# Patient Record
Sex: MALE | Race: OTHER | NOT HISPANIC OR LATINO | Employment: FULL TIME | URBAN - METROPOLITAN AREA
[De-identification: names, ages, dates, MRNs, and addresses within clinical notes are randomized per-mention and may not be internally consistent; named-entity substitution may affect disease eponyms.]

---

## 2019-08-04 ENCOUNTER — HOSPITAL ENCOUNTER (EMERGENCY)
Facility: HOSPITAL | Age: 29
Discharge: HOME | End: 2019-08-04
Attending: EMERGENCY MEDICINE | Admitting: EMERGENCY MEDICINE

## 2019-08-04 ENCOUNTER — APPOINTMENT (EMERGENCY)
Dept: RADIOLOGY | Facility: HOSPITAL | Age: 29
End: 2019-08-04

## 2019-08-04 VITALS
DIASTOLIC BLOOD PRESSURE: 61 MMHG | BODY MASS INDEX: 26.08 KG/M2 | TEMPERATURE: 98.3 F | OXYGEN SATURATION: 100 % | HEART RATE: 88 BPM | SYSTOLIC BLOOD PRESSURE: 136 MMHG | WEIGHT: 171.52 LBS | RESPIRATION RATE: 20 BRPM

## 2019-08-04 DIAGNOSIS — S43.014A ANTERIOR DISLOCATION OF RIGHT SHOULDER, INITIAL ENCOUNTER: ICD-10-CM

## 2019-08-04 DIAGNOSIS — M25.511 RIGHT SHOULDER PAIN: Primary | ICD-10-CM

## 2019-08-04 PROCEDURE — 23650 CLTX SHO DSLC W/MNPJ WO ANES: CPT | Performed by: EMERGENCY MEDICINE

## 2019-08-04 PROCEDURE — 96376 TX/PRO/DX INJ SAME DRUG ADON: CPT

## 2019-08-04 PROCEDURE — 99284 EMERGENCY DEPT VISIT MOD MDM: CPT

## 2019-08-04 PROCEDURE — 99285 EMERGENCY DEPT VISIT HI MDM: CPT | Performed by: EMERGENCY MEDICINE

## 2019-08-04 PROCEDURE — 96375 TX/PRO/DX INJ NEW DRUG ADDON: CPT

## 2019-08-04 PROCEDURE — 96374 THER/PROPH/DIAG INJ IV PUSH: CPT

## 2019-08-04 PROCEDURE — 73030 X-RAY EXAM OF SHOULDER: CPT

## 2019-08-04 RX ORDER — KETOROLAC TROMETHAMINE 30 MG/ML
30 INJECTION, SOLUTION INTRAMUSCULAR; INTRAVENOUS ONCE
Status: COMPLETED | OUTPATIENT
Start: 2019-08-04 | End: 2019-08-04

## 2019-08-04 RX ORDER — PROPOFOL 10 MG/ML
100 INJECTION, EMULSION INTRAVENOUS ONCE
Status: COMPLETED | OUTPATIENT
Start: 2019-08-04 | End: 2019-08-04

## 2019-08-04 RX ORDER — ACETAMINOPHEN 500 MG
1000 TABLET ORAL AS NEEDED
COMMUNITY
End: 2021-09-13 | Stop reason: ALTCHOICE

## 2019-08-04 RX ORDER — FENTANYL CITRATE 50 UG/ML
50 INJECTION, SOLUTION INTRAMUSCULAR; INTRAVENOUS ONCE
Status: COMPLETED | OUTPATIENT
Start: 2019-08-04 | End: 2019-08-04

## 2019-08-04 RX ORDER — IBUPROFEN 600 MG/1
600 TABLET ORAL EVERY 8 HOURS PRN
Qty: 15 TABLET | Refills: 0 | Status: SHIPPED | OUTPATIENT
Start: 2019-08-04 | End: 2020-03-10 | Stop reason: ALTCHOICE

## 2019-08-04 RX ORDER — PROPOFOL 10 MG/ML
20 INJECTION, EMULSION INTRAVENOUS ONCE
Status: COMPLETED | OUTPATIENT
Start: 2019-08-04 | End: 2019-08-04

## 2019-08-04 RX ORDER — FENTANYL CITRATE 50 UG/ML
100 INJECTION, SOLUTION INTRAMUSCULAR; INTRAVENOUS ONCE
Status: COMPLETED | OUTPATIENT
Start: 2019-08-04 | End: 2019-08-04

## 2019-08-04 RX ADMIN — FENTANYL CITRATE 50 MCG: 50 INJECTION, SOLUTION INTRAMUSCULAR; INTRAVENOUS at 16:45

## 2019-08-04 RX ADMIN — FENTANYL CITRATE 100 MCG: 50 INJECTION, SOLUTION INTRAMUSCULAR; INTRAVENOUS at 16:15

## 2019-08-04 RX ADMIN — PROPOFOL 80 MG: 10 INJECTION, EMULSION INTRAVENOUS at 16:44

## 2019-08-04 RX ADMIN — PROPOFOL 20 MG: 10 INJECTION, EMULSION INTRAVENOUS at 16:48

## 2019-08-04 RX ADMIN — KETOROLAC TROMETHAMINE 30 MG: 30 INJECTION, SOLUTION INTRAMUSCULAR at 16:15

## 2019-08-04 NOTE — ED PROVIDER NOTES
History  Chief Complaint   Patient presents with    Arm Pain     right arm pain, ligting something heavy, concerned for dislocated shoulder  History provided by:  Patient   used: No    Arm Pain   Location:  Right arm  Quality:  Felt popped out when lifting heavy box  Severity:  Moderate  Onset quality:  Sudden  Duration:  2 hours  Timing:  Sporadic  Progression:  Unchanged  Chronicity:  New  Context:  Patient was lifting heavy box, felt right shoulder popped out and went in and possibly popped back out  Relieved by:  Nothing  Worsened by:  Nothing  Ineffective treatments:  None  Associated symptoms: no abdominal pain, no chest pain, no cough, no diarrhea, no fever, no headaches, no loss of consciousness, no nausea, no rash, no shortness of breath, no sore throat and no vomiting    Risk factors:  None      Prior to Admission Medications   Prescriptions Last Dose Informant Patient Reported? Taking?   acetaminophen (TYLENOL) 500 mg tablet   Yes Yes   Sig: Take 1,000 mg by mouth as needed for mild pain      Facility-Administered Medications: None       History reviewed  No pertinent past medical history  History reviewed  No pertinent surgical history  History reviewed  No pertinent family history  I have reviewed and agree with the history as documented  Social History     Tobacco Use    Smoking status: Current Some Day Smoker    Smokeless tobacco: Never Used   Substance Use Topics    Alcohol use: Not Currently     Frequency: Never    Drug use: Never        Review of Systems   Constitutional: Negative for chills and fever  HENT: Negative for facial swelling, sore throat and trouble swallowing  Eyes: Negative for pain and visual disturbance  Respiratory: Negative for cough and shortness of breath  Cardiovascular: Negative for chest pain and leg swelling  Gastrointestinal: Negative for abdominal pain, blood in stool, diarrhea, nausea and vomiting     Genitourinary: Negative for dysuria and flank pain  Musculoskeletal: Negative for back pain, neck pain and neck stiffness  Skin: Negative for pallor and rash  Allergic/Immunologic: Negative for environmental allergies and immunocompromised state  Neurological: Negative for dizziness, loss of consciousness and headaches  Hematological: Negative for adenopathy  Does not bruise/bleed easily  Psychiatric/Behavioral: Negative for agitation and behavioral problems  All other systems reviewed and are negative  Physical Exam  Physical Exam   Constitutional: He is oriented to person, place, and time  He appears well-developed and well-nourished  No distress  HENT:   Head: Normocephalic and atraumatic  Eyes: EOM are normal    Neck: Normal range of motion  Neck supple  Cardiovascular: Normal rate, regular rhythm, normal heart sounds and intact distal pulses  Pulmonary/Chest: Effort normal and breath sounds normal    Abdominal: Soft  Bowel sounds are normal  There is no tenderness  There is no rebound and no guarding  Musculoskeletal:   Deformity and decreased ROM of right shoulder, NV intact distally   Neurological: He is alert and oriented to person, place, and time  Skin: Skin is warm and dry  Psychiatric: He has a normal mood and affect  Nursing note and vitals reviewed        Vital Signs  ED Triage Vitals   Temperature Pulse Respirations Blood Pressure SpO2   08/04/19 1536 08/04/19 1537 08/04/19 1537 08/04/19 1537 08/04/19 1537   98 3 °F (36 8 °C) 95 18 143/76 99 %      Temp Source Heart Rate Source Patient Position - Orthostatic VS BP Location FiO2 (%)   08/04/19 1536 08/04/19 1537 08/04/19 1537 08/04/19 1537 --   Temporal Monitor Sitting Left arm       Pain Score       08/04/19 1537       Worst Possible Pain           Vitals:    08/04/19 1658 08/04/19 1703 08/04/19 1708 08/04/19 1730   BP: 117/59 117/63 117/61 136/61   Pulse: 73 79 88 88   Patient Position - Orthostatic VS: Lying Lying Lying Lying Visual Acuity      ED Medications  Medications   fentanyl citrate (PF) 100 MCG/2ML 100 mcg (100 mcg Intravenous Given 8/4/19 1615)   ketorolac (TORADOL) injection 30 mg (30 mg Intravenous Given 8/4/19 1615)   propofol (DIPRIVAN) 200 MG/20ML bolus injection 100 mg (80 mg Intravenous Given by Other 8/4/19 1644)   fentanyl citrate (PF) 100 MCG/2ML 50 mcg (50 mcg Intravenous Given 8/4/19 1645)   propofol (DIPRIVAN) 200 MG/20ML bolus injection 20 mg (20 mg Intravenous Given by Other 8/4/19 1648)       Diagnostic Studies  Results Reviewed     None                 XR shoulder 2+ vw right   Final Result by Carolann Enrique MD (08/04 1712)      Anatomic alignment of the right glenohumeral joint status post closed reduction  No fracture              Workstation performed: [id number]         XR shoulder 2+ views RIGHT    (Results Pending)              Procedures  Procedural Sedation  Date/Time: 8/4/2019 4:59 PM  Performed by: Inocencia Allison MD  Authorized by: Inocencia Allison MD     Immediate pre-procedure details:     Reassessment: Patient reassessed immediately prior to procedure      Reviewed: vital signs      Verified: bag valve mask available, emergency equipment available, intubation equipment available, IV patency confirmed, oxygen available, reversal medications available and suction available    Procedure details (see MAR for exact dosages):     Preoxygenation:  Nasal cannula    Sedation:  Propofol    Analgesia:  Fentanyl    Intra-procedure monitoring:  Blood pressure monitoring, cardiac monitor, continuous capnometry, continuous pulse oximetry, frequent vital sign checks and frequent LOC assessments    Intra-procedure events: none    Post-procedure details:     Attendance: Constant attendance by certified staff until patient recovered      Recovery: Patient returned to pre-procedure baseline      Post-sedation assessments completed and reviewed: airway patency, cardiovascular function, hydration status, mental status, nausea/vomiting, pain level, respiratory function and temperature      Patient is stable for discharge or admission: yes      Patient tolerance: Tolerated well, no immediate complications  Orthopedic injury treatment  Date/Time: 8/4/2019 5:02 PM  Performed by: Radha Madrigal MD  Authorized by: Radha Madrigal MD     Patient Location:  ED  Other Assisting Provider: No    Written consent obtained?: Yes    Risks and benefits: Risks, benefits and alternatives were discussed    Consent given by:  Patient and parent  Patient states understanding of procedure being performed: Yes    Patient's understanding of procedure matches consent: Yes    Procedure consent matches procedure scheduled: Yes    Relevant documents present and verified: Yes    Test results available and properly labeled: Yes    Radiology Images displayed and confirmed  If images not available, report reviewed: Yes    Patient identity confirmed:  Verbally with patient  Time out: Immediately prior to the procedure a time out was called    Injury location:  Shoulder  Location details:  Right shoulder  Injury type:  Dislocation  Dislocation type: anterior    Chronicity:  New  Hill-Sachs deformity?: No    Neurovascular status: Neurovascularly intact    Distal perfusion: normal    Neurological function: normal    Range of motion: reduced    Local anesthesia used?: No    Sedation type:   Moderate (conscious) sedation (See separate Procedural Sedation form)  Manipulation performed?: Yes    Reduction method:  External rotation and traction and counter traction  Reduction method:  External rotation and traction and counter traction  Reduction method:  External rotation and traction and counter traction  Reduction method:  External rotation and traction and counter traction  Reduction method:  External rotation and traction and counter traction  Reduction method:  External rotation and traction and counter traction  Reduction successful?: Yes    Confirmation: Reduction confirmed by x-ray    Immobilization:  Other (comment) (Shoulder immobilizer)  Neurovascular status: Neurovascularly intact    Distal perfusion: normal    Neurological function: normal    Range of motion: normal    CriticalCare Time  Performed by: Adriel Peña MD  Authorized by: Adriel Peña MD     Critical care provider statement:     Critical care time (minutes):  30    Critical care time was exclusive of:  Separately billable procedures and treating other patients and teaching time    Critical care was necessary to treat or prevent imminent or life-threatening deterioration of the following conditions:  Trauma (Right shoulder dislocation, requiring procedural sedation and reduction)    Critical care was time spent personally by me on the following activities:  Blood draw for specimens, obtaining history from patient or surrogate, development of treatment plan with patient or surrogate, discussions with consultants, evaluation of patient's response to treatment, examination of patient, interpretation of cardiac output measurements, ordering and performing treatments and interventions, ordering and review of laboratory studies, ordering and review of radiographic studies, re-evaluation of patient's condition and review of old charts    I assumed direction of critical care for this patient from another provider in my specialty: no             ED Course  ED Course as of Aug 04 2152   Sun Aug 04, 2019   1631 X-ray showed anterior dislocation of the right shoulder, which was confirmed by radiologist       8285 Procedure sedation and reduction of the right shoulder anterior dislocation performed, confirmed on x-ray, also discussed with radiologist                                   MDM  Number of Diagnoses or Management Options  Anterior dislocation of right shoulder, initial encounter: new and requires workup  Right shoulder pain: new and requires workup  Diagnosis management comments: Patient is a 28-year-old male, comes in with complaints of right shoulder pain after he was lifting heavy box, felt that the shoulder popped out, went back in and then possibly pop back out; patient supporting the right arm in almost perpendicular position to stay comfortable; on exam deformity and decreased range of movement of the right shoulder noted  Differential diagnosis:  Right shoulder dislocation, AC separation, will check x-ray, give pain meds  Amount and/or Complexity of Data Reviewed  Tests in the radiology section of CPT®: ordered and reviewed  Discuss the patient with other providers: yes (Radiologist)  Independent visualization of images, tracings, or specimens: yes        Disposition  Final diagnoses:   Right shoulder pain   Anterior dislocation of right shoulder, initial encounter     Time reflects when diagnosis was documented in both MDM as applicable and the Disposition within this note     Time User Action Codes Description Comment    8/4/2019  3:55 PM Edward Fox 917 Right shoulder pain     8/4/2019  5:13 PM Julio Leader Add [B47 180F] Anterior dislocation of right shoulder, initial encounter       ED Disposition     ED Disposition Condition Date/Time Comment    Discharge Stable Sun Aug 4, 2019  5:13 PM Yolanda Mendoza discharge to home/self care              Follow-up Information     Follow up With Specialties Details Why Contact Info Additional [address] [facility] Orthopedic Surgery Schedule an appointment as soon as possible for a visit   [address] [facility] [id number]  [address] [place] [address], [address] [place] [address],  [address] [place] [address], [phone]          Discharge Medication List as of 8/4/2019  5:18 PM      START taking these medications    Details   ibuprofen (MOTRIN) 600 mg tablet Take 1 tablet (600 mg total) by mouth every 8 (eight) hours as needed for moderate pain, Starting Sun 8/4/2019, Print           No discharge procedures on file      ED Provider  Electronically Signed by           Angela Christensen MD  08/04/19 7284

## 2019-08-05 ENCOUNTER — HOSPITAL ENCOUNTER (EMERGENCY)
Facility: HOSPITAL | Age: 29
Discharge: HOME | End: 2019-08-05
Attending: EMERGENCY MEDICINE | Admitting: EMERGENCY MEDICINE

## 2019-08-05 VITALS
WEIGHT: 159.39 LBS | OXYGEN SATURATION: 100 % | BODY MASS INDEX: 24.24 KG/M2 | HEART RATE: 81 BPM | DIASTOLIC BLOOD PRESSURE: 69 MMHG | TEMPERATURE: 98.5 F | SYSTOLIC BLOOD PRESSURE: 133 MMHG | RESPIRATION RATE: 18 BRPM

## 2019-08-05 DIAGNOSIS — R11.2 NAUSEA AND VOMITING: Primary | ICD-10-CM

## 2019-08-05 LAB
BACTERIA UR QL AUTO: ABNORMAL /HPF
BILIRUB UR QL STRIP: ABNORMAL
CLARITY UR: CLEAR
COLOR UR: YELLOW
GLUCOSE UR STRIP-MCNC: NEGATIVE MG/DL
HGB UR QL STRIP.AUTO: ABNORMAL
KETONES UR STRIP-MCNC: ABNORMAL MG/DL
LEUKOCYTE ESTERASE UR QL STRIP: ABNORMAL
NITRITE UR QL STRIP: NEGATIVE
NON-SQ EPI CELLS URNS QL MICRO: ABNORMAL /HPF
OTHER STN SPEC: ABNORMAL
PH UR STRIP.AUTO: 5.5 [PH] (ref 4.5–8)
PROT UR STRIP-MCNC: ABNORMAL MG/DL
RBC #/AREA URNS AUTO: ABNORMAL /HPF
SP GR UR STRIP.AUTO: 1.02 (ref 1–1.03)
UROBILINOGEN UR QL STRIP.AUTO: 0.2 E.U./DL
WBC #/AREA URNS AUTO: ABNORMAL /HPF

## 2019-08-05 PROCEDURE — 87086 URINE CULTURE/COLONY COUNT: CPT

## 2019-08-05 PROCEDURE — 81001 URINALYSIS AUTO W/SCOPE: CPT

## 2019-08-05 PROCEDURE — 99283 EMERGENCY DEPT VISIT LOW MDM: CPT | Performed by: EMERGENCY MEDICINE

## 2019-08-05 PROCEDURE — 99283 EMERGENCY DEPT VISIT LOW MDM: CPT

## 2019-08-05 RX ORDER — ONDANSETRON 4 MG/1
4 TABLET, ORALLY DISINTEGRATING ORAL EVERY 6 HOURS PRN
Qty: 20 TABLET | Refills: 0 | Status: SHIPPED | OUTPATIENT
Start: 2019-08-05 | End: 2020-03-10 | Stop reason: ALTCHOICE

## 2019-08-05 RX ORDER — ONDANSETRON 4 MG/1
4 TABLET, ORALLY DISINTEGRATING ORAL ONCE
Status: COMPLETED | OUTPATIENT
Start: 2019-08-05 | End: 2019-08-05

## 2019-08-05 RX ADMIN — ONDANSETRON 4 MG: 4 TABLET, ORALLY DISINTEGRATING ORAL at 09:09

## 2019-08-05 NOTE — ED PROVIDER NOTES
History  Chief Complaint   Patient presents with    Nausea     pt c/o nausea and vomiting since last night, was here yesterday for dislocated shoulder and the medications that was given to him made him nauseas     35 YO male presents with nausea and vomiting starting last night  States thie began gradually, has been mostly constant  He has intermittently tolerated fluids but has not tried to eat anything  Pt denies associated abdominal pain but states his stomach feels mildly upset  He has not had diarrhea  Pt notes shoulder dislocation yesterday, he did receive propofol for sedation; unsure if this could have caused his symptoms  Unsure regarding sick contacts or suspicious food intake  Pt denies CP/SOB/F/C/D/C, no dysuria, burning on urination or blood in urine  History provided by:  Patient and relative   used: No    Nausea   The primary symptoms include nausea and vomiting  Primary symptoms do not include fever, abdominal pain, dysuria or rash  The illness began yesterday  The onset was gradual    The vomiting began yesterday  Vomiting occurs 2 to 5 times per day  The emesis contains stomach contents  The illness does not include chills  Prior to Admission Medications   Prescriptions Last Dose Informant Patient Reported? Taking?   acetaminophen (TYLENOL) 500 mg tablet 8/4/2019 at Unknown time  Yes Yes   Sig: Take 1,000 mg by mouth as needed for mild pain   ibuprofen (MOTRIN) 600 mg tablet 8/4/2019 at Unknown time  No Yes   Sig: Take 1 tablet (600 mg total) by mouth every 8 (eight) hours as needed for moderate pain      Facility-Administered Medications: None       History reviewed  No pertinent past medical history  History reviewed  No pertinent surgical history  History reviewed  No pertinent family history  I have reviewed and agree with the history as documented      Social History     Tobacco Use    Smoking status: Current Some Day Smoker    Smokeless tobacco: Never Used   Substance Use Topics    Alcohol use: Yes     Frequency: Never     Comment: occasionally    Drug use: Never        Review of Systems   Constitutional: Negative for chills and fever  HENT: Negative for dental problem  Eyes: Negative for visual disturbance  Respiratory: Negative for shortness of breath  Cardiovascular: Negative for chest pain  Gastrointestinal: Positive for nausea and vomiting  Negative for abdominal pain  Genitourinary: Negative for dysuria and frequency  Musculoskeletal: Negative for neck pain and neck stiffness  Skin: Negative for rash  Neurological: Negative for dizziness, weakness and light-headedness  Psychiatric/Behavioral: Negative for agitation, behavioral problems and confusion  All other systems reviewed and are negative  Physical Exam  Physical Exam   Constitutional: He is oriented to person, place, and time  He appears well-developed and well-nourished  HENT:   Head: Normocephalic and atraumatic  Eyes: Pupils are equal, round, and reactive to light  EOM are normal    Neck: Normal range of motion  Cardiovascular: Normal rate, regular rhythm and normal heart sounds  Pulmonary/Chest: Effort normal and breath sounds normal    Abdominal: Soft  There is no tenderness  Musculoskeletal: Normal range of motion  Neurological: He is alert and oriented to person, place, and time  Skin: Skin is warm and dry  Psychiatric: He has a normal mood and affect  His behavior is normal    Nursing note and vitals reviewed        Vital Signs  ED Triage Vitals [08/05/19 0850]   Temperature Pulse Respirations Blood Pressure SpO2   98 5 °F (36 9 °C) 81 18 133/69 100 %      Temp Source Heart Rate Source Patient Position - Orthostatic VS BP Location FiO2 (%)   Temporal Monitor Sitting Left arm --      Pain Score       2           Vitals:    08/05/19 0850   BP: 133/69   Pulse: 81   Patient Position - Orthostatic VS: Sitting         Visual Acuity      ED Medications  Medications   ondansetron (ZOFRAN-ODT) dispersible tablet 4 mg (4 mg Oral Given 8/5/19 0909)       Diagnostic Studies  Results Reviewed     Procedure Component Value Units Date/Time    Urine Microscopic [[id number]]  (Abnormal) Collected:  08/05/19 0933    Lab Status:  Final result Specimen:  Urine, Clean Catch Updated:  08/05/19 1051     RBC, UA 2-4 /hpf      WBC, UA 20-30 /hpf      Epithelial Cells Occasional /hpf      Bacteria, UA Occasional /hpf      OTHER OBSERVATIONS Trichomonas Organisms Present    Urine culture [[id number]] Collected:  08/05/19 0933    Lab Status: In process Specimen:  Urine, Clean Catch Updated:  08/05/19 1050    POCT urinalysis dipstick [[id number]]  (Abnormal) Resulted:  08/05/19 0920    Lab Status:  Final result Specimen:  Urine Updated:  08/05/19 0920    ED Urine Macroscopic [[id number]]  (Abnormal) Collected:  08/05/19 0933    Lab Status:  Final result Specimen:  Urine Updated:  08/05/19 0919     Color, UA Yellow     Clarity, UA Clear     pH, UA 5 5     Leukocytes, UA Small     Nitrite, UA Negative     Protein,  (2+) mg/dl      Glucose, UA Negative mg/dl      Ketones, UA 15 (1+) mg/dl      Urobilinogen, UA 0 2 E U /dl      Bilirubin, UA Interference- unable to analyze     Blood, UA Trace     Specific [place], UA 1 020    Narrative:       CLINITEK RESULT                 No orders to display              Procedures  Procedures       ED Course  ED Course as of Aug 05 1323   [facility] Aug 05, 2019   0135 Pt states feeling somewhat better, will PO challenge  MDM  Number of Diagnoses or Management Options  Nausea and vomiting: new and does not require workup  Diagnosis management comments: 1  Nausea with vomiting - No abdominal pain  Pt appears well hydrated  Will check urine for infection, try oral Zofran, reassess         Amount and/or Complexity of Data Reviewed  Clinical lab tests: ordered and reviewed  Obtain history from someone other than the patient: yes  Review and summarize past medical records: yes    Patient Progress  Patient progress: improved      Disposition  Final diagnoses:   Nausea and vomiting     Time reflects when diagnosis was documented in both MDM as applicable and the Disposition within this note     Time User Action Codes Description Comment    8/5/2019  9:49 AM Georgialoren Gabrielaalla JOEL Add [R11 2] Nausea and vomiting       ED Disposition     ED Disposition Condition Date/Time Comment    Discharge Stable Mon Aug 5, 2019  9:49 AM Carlo Stager discharge to home/self care  Follow-up Information    None         Discharge Medication List as of 8/5/2019  9:50 AM      START taking these medications    Details   ondansetron (ZOFRAN-ODT) 4 mg disintegrating tablet Take 1 tablet (4 mg total) by mouth every 6 (six) hours as needed for nausea, Starting Mon 8/5/2019, Print         CONTINUE these medications which have NOT CHANGED    Details   acetaminophen (TYLENOL) 500 mg tablet Take 1,000 mg by mouth as needed for mild pain, Historical Med      ibuprofen (MOTRIN) 600 mg tablet Take 1 tablet (600 mg total) by mouth every 8 (eight) hours as needed for moderate pain, Starting Sun 8/4/2019, Print           No discharge procedures on file      ED Provider  Electronically Signed by           Hermes Hook MD  08/05/19 8432

## 2019-08-05 NOTE — DISCHARGE INSTRUCTIONS
Take the zofran as needed for nausea, this can be placed under the tongue to dissolve if you are vomiting

## 2019-08-06 LAB — BACTERIA UR CULT: NORMAL

## 2019-08-10 VITALS — WEIGHT: 174.6 LBS | BODY MASS INDEX: 25.86 KG/M2 | HEIGHT: 69 IN

## 2019-08-10 DIAGNOSIS — S43.004A SHOULDER DISLOCATION, RIGHT, INITIAL ENCOUNTER: Primary | ICD-10-CM

## 2019-08-10 PROCEDURE — 99204 OFFICE O/P NEW MOD 45 MIN: CPT | Performed by: FAMILY MEDICINE

## 2019-08-10 NOTE — PROGRESS NOTES
1  Shoulder dislocation, right, initial encounter       No orders of the defined types were placed in this encounter  Imaging Studies (I personally reviewed images in PACS and report):  X-ray right shoulder a 419:  Anatomic alignment right glenohumeral joint status post reduction  No fracture  X-ray right shoulder 08/04/2019:  Anterior glenohumeral joint dislocation with no underlying fracture    IMPRESSION:  Right shoulder dislocation  Date of Injury:  08/04/2019  Follow-up interval:  6 days      Repeat X-ray next visit:    Yes if not done right shoulder use as below  true AP   scapular Y   axillary  [place] [employer] view       Return in about 2 weeks (around 8/24/2019)  Patient Instructions   Explained the patient that he has dislocation of his right shoulder anteriorly status post reduction and appears stable at this time  Explained there is a risk of recurrent dislocation especially if he has underlying soft tissue injury such as a labral tear which cannot be seen on an x-ray  At this time I recommended x-rays with specialty views to further evaluate for any bony abnormality which cannot be seen on regular x-rays performed in the emergency department  Patient has no medical insurance at this time and his pain out-of-pocket  He plans to discuss having a discounted x-ray with [facility]  staff and also working on reinstating his medical insurance through his employer  I explained that he could attempt trial of rehabilitation with physical therapy and if he does have persistent pain or recurrent dislocation that I would highly recommend MRI evaluation and consideration of surgical intervention specially if he wants to remain active and since he is right handed  Explained the patient for his nausea and vomiting appears he does have resolving gastroenteritis  I recommend maintaining hydration with water as well as electrolytes such as Gatorade    He may continue to eat food as tolerated and is having difficulty I recommended Brat diet denies rice apples toe post   I explained to him that if he has any worsening or consistent abdominal pain or worsening vomiting that he must go to emergency department immediately for further evaluation as he could have a medical emergency  CHIEF COMPLAINT:  Right shoulder dislocation    HPI:  Peg Lang is a 34 y o  male  who presents for       Visit 08/10/2019:  Evaluation right shoulder dislocation occurred on 08/04/2019  Patient did present to the ER for initial evaluation  Summary of ER note 08/04/2019:  Patient follow up pop when lifting heavy box with rods in them helping his friend relocate some items in his garage  Patient noted to have anterior right shoulder dislocation  He was neurovascularly intact  Reduction with external rotation traction and counter traction  Conscious sedation  Reduction confirmed on x-ray  Patient states that recently when he went home after his ER visit on 08/04/2019 he developed nausea and vomiting that night  He also had persistent nausea vomiting next few days  He has intermittent cramping in his belly but denies any worsening abdominal pain or any constant abdominal pain  Last bowel movement no blood, no constipation, no diarrhea, brown usual color and consistency  Patient states he did have 1 episode this morning of small amount of vomitus but this is greatly improved since initially beginning earlier this week  He currently denies any abdominal pain  He denies any difficulty urinating  He denies any pain when he urinates  He denies any blood in his urine  States that he believes he became somewhat sick after having his conscious sedation during his reduction  Review of Systems   Constitutional: Negative for chills, fever and unexpected weight change  HENT: Negative for hearing loss, nosebleeds and sore throat      Eyes: Negative for pain, redness and visual disturbance  Respiratory: Negative for cough, shortness of breath and wheezing  Cardiovascular: Negative for chest pain, palpitations and leg swelling  Gastrointestinal: Negative for abdominal distention  Endocrine: Negative for polydipsia and polyuria  Genitourinary: Negative for dysuria and hematuria  Skin: Negative for rash and wound  Neurological: Negative for dizziness, numbness and headaches  Psychiatric/Behavioral: Negative for decreased concentration and suicidal ideas  Following history reviewed and update:    History reviewed  No pertinent past medical history  Past Surgical History:   Procedure Laterality Date    SKIN GRAFT       Social History   Social History     Substance and Sexual Activity   Alcohol Use Yes    Frequency: Never    Comment: occasionally     Social History     Substance and Sexual Activity   Drug Use Never     Social History     Tobacco Use   Smoking Status Current Some Day Smoker   Smokeless Tobacco Never Used     History reviewed  No pertinent family history  No Known Allergies       Physical Exam  Ht 5' 9" (1 753 m)   Wt 79 2 kg (174 lb 9 6 oz)   BMI 25 78 kg/m²     Constitutional:  see vital signs  Gen: well-developed, normocephalic/atraumatic, well-groomed  Eyes: No inflammation or discharge of conjunctiva or lids; sclera clear   Pharynx: no inflammation, lesion, or mass of lips  Neck: supple, no masses, non-distended  MSK: no inflammation, lesion, mass, or clubbing of nails and digits except for other than mentioned below  SKIN: no visible rashes or skin lesions  Pulmonary/Chest: Effort normal  No respiratory distress     NEURO: cranial nerves grossly intact  PSYCH:  Alert and oriented to person, place, and time; recent and remote memory intact; mood normal, no depression, anxiety, or agitation, judgment and insight good and intact     Ortho Exam    Cervical  ROM: intact  Tenderness: no spinous process tenderness;   Sensation UE Bilateral:  C5: normal  C6: normal  C7: normal  C8: normal  T1: normal  Strength UE: 5/5 elbow, wrist, fingers bilatearl  Reflexes: symmetric bilateral triceps, biceps, corachobrachiais  Spurlings:     RIGHT SHOULDER:  Erythema: no  Swelling: no  Increased Warmth: no    Tenderness:  non    ROM  Touchdown sign: intact  Appley Scratch Test: symmetric  Passive: symmetric    Strength  Abduction: 5/5  ER: 5/5  IR: 5/5    Drop-Arm: negative  Emptycan: negative  Belly Press: negative  Lift-off Test:    Aragon: negative  Neer: negative  Cross-Arm: negative  Speeds: negative    Internal Impingement:+  (crank position pain)    Labral Crank Test :+  (abducted 90, axial load, guided IR & Er)    Modified Labral Shift: negative  (seated, ER, abduction, axial load, guided abd/add)    [place]'s Test:  negative  (FF 90, abd 15, resist thumbs up-, resist thumbs down+)    Apprehension:+  Edith's Relocation Maneuver:+    LEFT SHOULDER:  Strength  Abduction: 5/5  ER: 5/5  IR: 5/5    ROM  Touchdown sign: intact  [place] Scratch Test: symmetric  Passive: symmetric    Empty can: negative           Procedures

## 2019-08-10 NOTE — PATIENT INSTRUCTIONS
Explained the patient that he has dislocation of his right shoulder anteriorly status post reduction and appears stable at this time  Explained there is a risk of recurrent dislocation especially if he has underlying soft tissue injury such as a labral tear which cannot be seen on an x-ray  At this time I recommended x-rays with specialty views to further evaluate for any bony abnormality which cannot be seen on regular x-rays performed in the emergency department  Patient has no medical insurance at this time and his pain out-of-pocket  He plans to discuss having a discounted x-ray with [facility]  staff and also working on reinstating his medical insurance through his employer  I explained that he could attempt trial of rehabilitation with physical therapy and if he does have persistent pain or recurrent dislocation that I would highly recommend MRI evaluation and consideration of surgical intervention specially if he wants to remain active and since he is right handed  Explained the patient for his nausea and vomiting appears he does have resolving gastroenteritis  I recommend maintaining hydration with water as well as electrolytes such as Gatorade  He may continue to eat food as tolerated and is having difficulty I recommended Brat diet denies rice apples toe post   I explained to him that if he has any worsening or consistent abdominal pain or worsening vomiting that he must go to emergency department immediately for further evaluation as he could have a medical emergency

## 2019-12-26 ENCOUNTER — APPOINTMENT (EMERGENCY)
Dept: RADIOLOGY | Facility: HOSPITAL | Age: 29
End: 2019-12-26

## 2019-12-26 ENCOUNTER — HOSPITAL ENCOUNTER (EMERGENCY)
Facility: HOSPITAL | Age: 29
Discharge: HOME | End: 2019-12-26
Attending: EMERGENCY MEDICINE | Admitting: EMERGENCY MEDICINE

## 2019-12-26 VITALS
SYSTOLIC BLOOD PRESSURE: 122 MMHG | RESPIRATION RATE: 16 BRPM | WEIGHT: 175.49 LBS | OXYGEN SATURATION: 100 % | BODY MASS INDEX: 25.91 KG/M2 | HEART RATE: 74 BPM | DIASTOLIC BLOOD PRESSURE: 61 MMHG | TEMPERATURE: 98.8 F

## 2019-12-26 DIAGNOSIS — S43.014A ANTERIOR SHOULDER DISLOCATION, RIGHT, INITIAL ENCOUNTER: Primary | ICD-10-CM

## 2019-12-26 PROCEDURE — 23650 CLTX SHO DSLC W/MNPJ WO ANES: CPT | Performed by: EMERGENCY MEDICINE

## 2019-12-26 PROCEDURE — 99283 EMERGENCY DEPT VISIT LOW MDM: CPT

## 2019-12-26 PROCEDURE — 99285 EMERGENCY DEPT VISIT HI MDM: CPT | Performed by: EMERGENCY MEDICINE

## 2019-12-26 PROCEDURE — 73030 X-RAY EXAM OF SHOULDER: CPT

## 2019-12-26 PROCEDURE — 96374 THER/PROPH/DIAG INJ IV PUSH: CPT

## 2019-12-26 PROCEDURE — 99152 MOD SED SAME PHYS/QHP 5/>YRS: CPT | Performed by: EMERGENCY MEDICINE

## 2019-12-26 RX ORDER — FENTANYL CITRATE 50 UG/ML
50 INJECTION, SOLUTION INTRAMUSCULAR; INTRAVENOUS ONCE
Status: COMPLETED | OUTPATIENT
Start: 2019-12-26 | End: 2019-12-26

## 2019-12-26 RX ORDER — PROPOFOL 10 MG/ML
200 INJECTION, EMULSION INTRAVENOUS ONCE
Status: COMPLETED | OUTPATIENT
Start: 2019-12-26 | End: 2019-12-26

## 2019-12-26 RX ADMIN — PROPOFOL 200 MG: 10 INJECTION, EMULSION INTRAVENOUS at 18:17

## 2019-12-26 RX ADMIN — FENTANYL CITRATE 50 MCG: 50 INJECTION, SOLUTION INTRAMUSCULAR; INTRAVENOUS at 17:53

## 2019-12-26 NOTE — ED NOTES
Per pt called girlfriend to let her know she could come back to pt's room      Becky Deleon RN  92/16/04 1434

## 2019-12-26 NOTE — ED ATTENDING ATTESTATION
12/26/2019  ILuis DO, saw and evaluated the patient  I have discussed the patient with the resident/non-physician practitioner and agree with the resident's/non-physician practitioner's findings, Plan of Care, and MDM as documented in the resident's/non-physician practitioner's note, except where noted  All available labs and Radiology studies were reviewed  I was present for key portions of any procedure(s) performed by the resident/non-physician practitioner and I was immediately available to provide assistance  At this point I agree with the current assessment done in the Emergency Department  I have conducted an independent evaluation of this patient a history and physical is as follows:    ED Course         Critical Care Time  Procedures  82-year-old male presents with right shoulder pain and probable dislocation  Patient states he was lifting the kaur of his car using only his right arm and felt his shoulder come out of place  He has a history of previous shoulder dislocation but never followed up   On exam he is alert complaining of pain in the right shoulder there is a deformity the right shoulder consistent with anterior dislocation  The right upper extremity is warm with good distal pulses  He denies numbness of the arm  Will do x-ray to confirm dislocation and rule out fracture  Will attempt to reduce the dislocation

## 2019-12-26 NOTE — ED PROVIDER NOTES
History  Chief Complaint   Patient presents with    Shoulder Injury     Patient reports he was lifting his car kaur, and believes his R shoulder is now dislocated  Patient states his shoulder has been dislocated in the past       This is a 61-year-old male with no significant past medical history who presents to the emergency department this evening with concerns over a right shoulder dislocation  Patient states that he was working on his car and lifted the kaur of his car up when he suddenly felt his shoulder pop out of place    He states that he has dislocated his right shoulder before and it was a few months ago  His current symptoms feel very similar to the previous  He denies any numbness, tingling, or weakness and he states that the pain is minimal as long as he does not move  He has not taken any pain medications as of yet  Prior to Admission Medications   Prescriptions Last Dose Informant Patient Reported? Taking?   acetaminophen (TYLENOL) 500 mg tablet   Yes No   Sig: Take 1,000 mg by mouth as needed for mild pain   ibuprofen (MOTRIN) 600 mg tablet   No No   Sig: Take 1 tablet (600 mg total) by mouth every 8 (eight) hours as needed for moderate pain   ondansetron (ZOFRAN-ODT) 4 mg disintegrating tablet   No No   Sig: Take 1 tablet (4 mg total) by mouth every 6 (six) hours as needed for nausea      Facility-Administered Medications: None       History reviewed  No pertinent past medical history  Past Surgical History:   Procedure Laterality Date    SKIN GRAFT         History reviewed  No pertinent family history  I have reviewed and agree with the history as documented  Social History     Tobacco Use    Smoking status: Current Some Day Smoker    Smokeless tobacco: Never Used   Substance Use Topics    Alcohol use: Yes     Frequency: Never     Comment: occasionally    Drug use: Never        Review of Systems   Constitutional: Negative for chills, diaphoresis and fever     HENT: Negative for congestion, rhinorrhea, sinus pressure and sore throat  Eyes: Negative for visual disturbance  Respiratory: Negative for cough, chest tightness and shortness of breath  Cardiovascular: Negative for chest pain  Gastrointestinal: Negative for abdominal pain, constipation, diarrhea, nausea and vomiting  Genitourinary: Negative for dysuria, frequency, hematuria and urgency  Musculoskeletal: Positive for arthralgias  Negative for myalgias  Skin: Negative for color change and rash  Neurological: Negative for dizziness, numbness and headaches  Physical Exam  ED Triage Vitals [12/26/19 1645]   Temperature Pulse Respirations Blood Pressure SpO2   98 8 °F (37 1 °C) (!) 106 18 136/58 98 %      Temp Source Heart Rate Source Patient Position - Orthostatic VS BP Location FiO2 (%)   Temporal Monitor Sitting Left arm --      Pain Score       Worst Possible Pain             Orthostatic Vital Signs  Vitals:    12/26/19 1835 12/26/19 1835 12/26/19 1836 12/26/19 1839   BP: 127/62  122/61    Pulse: 77 78  74   Patient Position - Orthostatic VS:           Physical Exam   Constitutional: He is oriented to person, place, and time  He appears well-developed and well-nourished  No distress  HENT:   Head: Normocephalic and atraumatic  Eyes: Pupils are equal, round, and reactive to light  Conjunctivae are normal    Neck: Normal range of motion  Neck supple  No JVD present  Cardiovascular: Normal rate, regular rhythm and normal heart sounds  Exam reveals no gallop and no friction rub  No murmur heard  Pulmonary/Chest: Effort normal and breath sounds normal  No stridor  No respiratory distress  He has no wheezes  He has no rales  Abdominal: Soft  Bowel sounds are normal  He exhibits no distension  There is no tenderness  There is no guarding  Musculoskeletal: Normal range of motion   He exhibits tenderness (Right shoulder) and deformity ( indentation of the skin just inferior to the acromial process on the right )  He exhibits no edema  Neurological: He is alert and oriented to person, place, and time  No cranial nerve deficit or sensory deficit  He exhibits normal muscle tone  No axillary nerve sensory disturbances bilaterally   Skin: Skin is warm and dry  No rash noted  He is not diaphoretic  No erythema  No pallor  Psychiatric: He has a normal mood and affect  His behavior is normal    Nursing note and vitals reviewed  ED Medications  Medications   fentanyl citrate (PF) 100 MCG/2ML 50 mcg (50 mcg Intravenous Given 12/26/19 1753)   propofol (DIPRIVAN) 200 MG/20ML bolus injection 200 mg (200 mg Intravenous Given 12/26/19 1817)       Diagnostic Studies  Results Reviewed     None                 XR shoulder 2+ views RIGHT   ED Interpretation by Vero Sosa MD (12/26 1728)   Anterior shoulder dislocation      Final Result by Renata Nieves MD (12/26 1824)      Anterior dislocation of the right shoulder  Workstation performed: [id number]         XR shoulder 2+ views RIGHT    (Results Pending)         Procedures  Orthopedic injury treatment  Date/Time: 12/26/2019 6:00 PM  Performed by: Vero Sosa MD  Authorized by: Vero Sosa MD     Patient Location:  ED  Other Assisting Provider: Yes (comment) Jules Roa    Verbal consent obtained?: Yes    Consent given by:  Patient  Patient identity confirmed:  Verbally with patient and arm band  Injury location:  Shoulder  Location details:  Right shoulder  Injury type:  Dislocation  Dislocation type: anterior    Chronicity:  Recurrent  Hill-Sachs deformity?: No    Neurovascular status: Neurovascularly intact    Distal perfusion: normal    Neurological function: normal    Range of motion: normal    Local anesthesia used?: No    General anesthesia used?: No    Sedation type:   Moderate (conscious) sedation (See separate Procedural Sedation form)  Manipulation performed?: Yes    Reduction method:  Kris maneuver  Reduction method:  Kris maneuver  Reduction method:  Kris maneuver  Reduction method:  Kris maneuver  Reduction method:  Kris maneuver  Reduction method:  Kris maneuver  Skeletal traction used?: Yes    Reduction successful?: Yes    Confirmation: Reduction confirmed by x-ray    Immobilization:  Sling  Neurovascular status: Neurovascularly intact    Distal perfusion: normal    Neurological function: normal    Range of motion: normal    Patient tolerance:  Patient tolerated the procedure well with no immediate complications  Procedural Sedation  Date/Time: 12/26/2019 6:00 PM  Performed by: Jeet Shelton MD  Authorized by: Jeet Shelton MD     Immediate pre-procedure details:     Reassessment: Patient reassessed immediately prior to procedure      Reviewed: vital signs      Verified: bag valve mask available, emergency equipment available, intubation equipment available, IV patency confirmed, oxygen available and suction available    Procedure details (see MAR for exact dosages):     Sedation start time:  12/26/2019 6:20 PM    Preoxygenation:  Nasal cannula    Sedation:  Propofol    Analgesia:  Fentanyl    Intra-procedure monitoring:  Blood pressure monitoring, continuous capnometry, frequent LOC assessments, frequent vital sign checks, continuous pulse oximetry and cardiac monitor    Intra-procedure events: none      Sedation end time:  12/26/2019 6:32 PM    Total sedation time (minutes):  12  Post-procedure details:     Post-sedation assessment completed:  12/26/2019 7:00 PM    Attendance: Constant attendance by certified staff until patient recovered      Recovery: Patient returned to pre-procedure baseline      Post-sedation assessments completed and reviewed: airway patency, cardiovascular function, hydration status, mental status, nausea/vomiting, pain level and respiratory function      Patient is stable for discharge or admission: yes      Patient tolerance:   Tolerated well, no immediate complications  Pre-Procedural Sedation  Performed by: Austen Bliss MD  Authorized by: Austen Bliss MD     Consent:     Consent obtained:  Verbal    Consent given by:  Patient    Risks discussed: Allergic reaction, prolonged hypoxia resulting in organ damage and respiratory compromise necessitating ventilatory assistance and intubation    Alternatives discussed:  Analgesia without sedation  Universal protocol:     Patient identity confirmation method:  Verbally with patient and arm band  Indications:     Sedation purpose:  Dislocation reduction    Procedure necessitating sedation performed by:  Physician performing sedation    Intended level of sedation:  Moderate (conscious sedation)  Pre-sedation assessment:     ASA classification: class 1 - normal, healthy patient      Neck mobility: normal      Mouth opening:  3 or more finger widths    Thyromental distance:  4 finger widths    Mallampati score:  I - soft palate, uvula, fauces, pillars visible    Pre-sedation assessments completed and reviewed: airway patency, cardiovascular function, hydration status, mental status, nausea/vomiting, pain level and respiratory function      History of difficult intubation: yes      Pre-sedation assessment completed:  12/26/2019 6:15 PM      Conscious Sedation Assessment      Classification Score   ASA Scale Assessment  1-Healthy patient, no disease outside surgical process filed at 12/26/2019 6234          ED Course                               MDM  Number of Diagnoses or Management Options  Anterior shoulder dislocation, right, initial encounter:   Diagnosis management comments: Attempted to relocate the patient shoulder without any analgesia using the Kris technique as he has a recurrent dislocation but was unsuccessful  Patient was then given 50 mg of fentanyl and 100 mg of propofol for conscious sedation and analgesia and the Kris technique was repeated this time successfully    Postprocedure x-ray confirmed that the shoulder had been relocated and the patient was given a sling and shoulder immobilizer and told to follow up with Orthopedic surgery as an outpatient  Patient discharged home in good condition with instructions to return to the emergency department for any worsening or otherwise concerning symptoms  Disposition  Final diagnoses:   Anterior shoulder dislocation, right, initial encounter     Time reflects when diagnosis was documented in both MDM as applicable and the Disposition within this note     Time User Action Codes Description Comment    12/26/2019  7:02 PM Noemyshirlene Reyes Add [[id number]] Anterior shoulder dislocation, right, initial encounter       ED Disposition     ED Disposition Condition Date/Time Comment    Discharge Stable Thu Dec 26, 2019  7:02 PM Dayami Peer discharge to home/self care              Follow-up Information     Follow up With Specialties Details Why Contact Info Additional Information    Ovidio Kingsley, [address] [facility]  [phone]       [facility] Emergency Department Emergency Medicine  If symptoms worsen [facility] [address]  [phone] [place] ED, [address] [place], [place], [facility] [address] Orthopedic Surgery   [address] [address]  [phone] [facility] [place] [address] [place], [place], [address]  [facility]          Discharge Medication List as of 12/26/2019  7:16 PM      CONTINUE these medications which have NOT CHANGED    Details   acetaminophen (TYLENOL) 500 mg tablet Take 1,000 mg by mouth as needed for mild pain, Historical Med      ibuprofen (MOTRIN) 600 mg tablet Take 1 tablet (600 mg total) by mouth every 8 (eight) hours as needed for moderate pain, Starting Sun 8/4/2019, Print      ondansetron (ZOFRAN-ODT) 4 mg disintegrating tablet Take 1 tablet (4 mg total) by mouth every 6 (six) hours as needed for nausea, Starting Mon 8/5/2019, Print           No discharge procedures on file  ED Provider  Attending physically available and evaluated Joaquina Morris I managed the patient along with the ED Attending      Electronically Signed by         Gerardo Wayne MD  12/27/19 97

## 2019-12-27 NOTE — DISCHARGE INSTRUCTIONS
Please follow up with Orthopedic surgery at the contact information below  Return to the ED for any worsening or otherwise concerning symptoms  Procedural Sedation   WHAT YOU NEED TO KNOW:   Procedural sedation is medicine used during procedures to help you feel relaxed and calm  You will remember little to none of the procedure  After sedation you may feel tired, weak, or unsteady on your feet  You may also have trouble concentrating or short-term memory loss  These symptoms should go away in 24 hours or less  DISCHARGE INSTRUCTIONS:   Call 911 or have someone else call for any of the following:   · You have sudden trouble breathing  · You cannot be woken  Return to the emergency department if:   · You have a severe headache or dizziness  · Your heart is beating faster than usual   Contact your healthcare provider if:   · You have a fever or chills  · Your skin is itchy, swollen, or you have a rash  · You have nausea or are vomiting for more than 8 hours after the procedure  · You have questions or concerns about your condition or care  Self-care:   · Have someone stay with you for 24 hours  This person can drive you to errands and help you do things around the house  This person can also watch for problems  · Rest and do quiet activities for 24 hours  Do not exercise, ride a bike, or play sports  Stand up slowly to prevent dizziness and falls  Take short walks around the house with another person  Slowly return to your usual activities the next day  · Do not drive or use dangerous machines or tools for 24 hours  You may injure yourself or others  Examples include a lawnmower, saw, or drill  Do not return to work for 24 hours if you use dangerous machines or tools for work  · Do not make important decisions for 24 hours  For example, do not sign important papers or invest money  · Drink liquids as directed  Liquids help flush the sedation medicine out of your body   Ask how much liquid to drink each day and which liquids are best for you  · Eat small, frequent meals to prevent nausea and vomiting  Start with clear liquids such as juice or broth  If you do not vomit after clear liquids, you can eat your usual foods  · Do not drink alcohol or take medicines that make you drowsy  This includes medicines that help you sleep and anxiety medicines  Ask your healthcare provider if it is safe for you to take pain medicine  Follow up with your healthcare provider as directed:  Write down your questions so you remember to ask them during your visits  © 2017 [facility] Information is for End User's use only and may not be sold, redistributed or otherwise used for commercial purposes  All illustrations and images included in CareNotes® are the copyrighted property of [employer] , [employer]  or Hector Carmona  The above information is an  only  It is not intended as medical advice for individual conditions or treatments  Talk to your doctor, nurse or pharmacist before following any medical regimen to see if it is safe and effective for you

## 2020-03-10 ENCOUNTER — OFFICE VISIT (OUTPATIENT)
Dept: FAMILY MEDICINE CLINIC | Facility: CLINIC | Age: 30
End: 2020-03-10
Payer: COMMERCIAL

## 2020-03-10 VITALS
HEART RATE: 86 BPM | WEIGHT: 164 LBS | HEIGHT: 69 IN | SYSTOLIC BLOOD PRESSURE: 128 MMHG | DIASTOLIC BLOOD PRESSURE: 80 MMHG | RESPIRATION RATE: 16 BRPM | TEMPERATURE: 97.6 F | BODY MASS INDEX: 24.29 KG/M2 | OXYGEN SATURATION: 98 %

## 2020-03-10 DIAGNOSIS — Z00.00 WELL ADULT EXAM: Primary | ICD-10-CM

## 2020-03-10 DIAGNOSIS — Z11.3 SCREENING FOR STD (SEXUALLY TRANSMITTED DISEASE): ICD-10-CM

## 2020-03-10 PROCEDURE — 99385 PREV VISIT NEW AGE 18-39: CPT | Performed by: FAMILY MEDICINE

## 2020-03-10 PROCEDURE — 3008F BODY MASS INDEX DOCD: CPT | Performed by: FAMILY MEDICINE

## 2020-03-10 RX ORDER — AMOXICILLIN 500 MG/1
CAPSULE ORAL
COMMUNITY
Start: 2020-03-05 | End: 2021-09-13 | Stop reason: ALTCHOICE

## 2020-03-10 NOTE — ASSESSMENT & PLAN NOTE
Recommended to follow a well-balanced diet, regular exercise  Patient states that he had Tetanus booster in 2018  Declined Flu vaccination  Recommended regular dental visits

## 2020-03-10 NOTE — ASSESSMENT & PLAN NOTE
Patient was treated for Chlamydia infection in September 2019  C/o yellowish penile discharge  No dysuria  Will screen for STI's  Counseled regarding safe sexual practices  Use condoms regularly

## 2020-03-10 NOTE — PROGRESS NOTES
Chief Complaint   Patient presents with   [facility] Patient     Health Maintenance   Topic Date Due    Pneumococcal Vaccine: Pediatrics (0 to 5 Years) and At-Risk Patients (6 to 59 Years) (1 of 1 - PPSV23) 05/08/1998    HIV Screening  05/08/2007    Annual Physical  05/08/2010    DTaP,Tdap,and Td Vaccines (7 - Td) 02/18/2018    Influenza Vaccine  03/10/2021 (Originally 7/1/2019)    Depression Screening PHQ  03/10/2021    BMI: Adult  03/10/2021    Pneumococcal Vaccine: 65+ Years (1 of 2 - PCV13) 05/08/2057    HIB Vaccine  Completed    Hepatitis B Vaccine  Completed    IPV Vaccine  Completed    Hepatitis A Vaccine  Aged Out    Meningococcal ACWY Vaccine  Aged Out    HPV Vaccine  Aged Out     BMI Counseling: Body mass index is 24 57 kg/m²  The BMI is above normal  Nutrition recommendations include reducing portion sizes, decreasing overall calorie intake, 3-5 servings of fruits/vegetables daily, reducing fast food intake, consuming healthier snacks, decreasing soda and/or juice intake, moderation in carbohydrate intake, increasing intake of lean protein, reducing intake of saturated fat and trans fat and reducing intake of cholesterol  Exercise recommendations include moderate aerobic physical activity for 150 minutes/week  Assessment/Plan:    Well adult exam  Recommended to follow a well-balanced diet, regular exercise  Patient states that he had Tetanus booster in 2018  Declined Flu vaccination  Recommended regular dental visits  Screening for STD (sexually transmitted disease)  Patient was treated for Chlamydia infection in September 2019  C/o yellowish penile discharge  No dysuria  Will screen for STI's  Counseled regarding safe sexual practices  Use condoms regularly  Schedule physical exam in 1 year  Call office with any acute problems       Diagnoses and all orders for this visit:    Well adult exam    Screening for STD (sexually transmitted disease)  - HIV 1/2 Antigen/Antibody (4th Generation) w Reflex [facility]; Future  -     Chlamydia/GC amplified DNA by PCR; Future  -     Hepatitis C antibody; Future  -     Hepatitis B surface antigen; Future  -     RPR; Future    Other orders  -     amoxicillin (AMOXIL) 500 mg capsule          Subjective:      Patient ID: Ania Black is a 34 y o  male  HPI     New patient presents to establish medical care,  physical exam       Patient has not been seen by PCP for over 5 years  Patient states that he was treated for Chlamydia infection at [facility] in September 2019  C/o yellowish penile discharge  No dysuria  His girlfriend was recently treated for bacterial vaginosis  Patient uses condoms but not consistently  Denies other sexual partners since September 2019  Patient works full-time  No H/o asthma or allergies  Quit smoking in 1/20  Denies alcohol use  Smokes marijuana occasionally  Family history is positive for DM in maternal grandmother  The following portions of the patient's history were reviewed and updated as appropriate: allergies, current medications, past family history, past social history, past surgical history and problem list     Review of Systems   Constitutional: Negative for activity change, appetite change, chills, fatigue and fever  HENT: Negative for congestion, dental problem, ear pain, mouth sores, nosebleeds, sore throat, tinnitus and trouble swallowing  Eyes: Negative for pain, discharge, redness, itching and visual disturbance  Wears glasses   Respiratory: Negative for cough, chest tightness, shortness of breath and wheezing  Cardiovascular: Negative for chest pain, palpitations and leg swelling  Gastrointestinal: Negative for abdominal pain, blood in stool, constipation, diarrhea, nausea and vomiting  Genitourinary: Positive for discharge  Negative for difficulty urinating, dysuria, flank pain, frequency, hematuria and testicular pain  Musculoskeletal: Negative for arthralgias, back pain, joint swelling, myalgias and neck pain  Skin: Negative for rash and wound  Neurological: Negative for dizziness, seizures, syncope and headaches  Hematological: Negative  Psychiatric/Behavioral: Negative for sleep disturbance  The patient is not nervous/anxious  Objective:      /80 (BP Location: Left arm, Patient Position: Sitting, Cuff Size: Standard)   Pulse 86   Temp 97 6 °F (36 4 °C) (Tympanic)   Resp 16   Ht 5' 8 5" (1 74 m)   Wt 74 4 kg (164 lb)   SpO2 98%   BMI 24 57 kg/m²          Physical Exam   Constitutional: He appears well-developed and well-nourished  HENT:   Head: Normocephalic and atraumatic  Right Ear: External ear normal    Left Ear: External ear normal    Mouth/Throat: Oropharynx is clear and moist    Eyes: Pupils are equal, round, and reactive to light  Conjunctivae are normal    Cardiovascular: Normal rate, regular rhythm and normal heart sounds  No murmur heard  No BL LE edema   Pulmonary/Chest: Effort normal and breath sounds normal    Abdominal: Soft  Bowel sounds are normal  There is no tenderness  Musculoskeletal: Normal range of motion  He exhibits no edema, tenderness or deformity  Neurological: No cranial nerve deficit  Coordination normal    Skin: Skin is warm and dry  No rash noted  Psychiatric: He has a normal mood and affect  His behavior is normal    Nursing note and vitals reviewed

## 2020-06-09 LAB
C TRACH RRNA SPEC QL NAA+PROBE: NOT DETECTED
HBV SURFACE AG SERPL QL IA: NORMAL
HCV AB S/CO SERPL IA: 0.02
HCV AB SERPL QL IA: NORMAL
HIV 1+2 AB+HIV1 P24 AG SERPL QL IA: NORMAL
N GONORRHOEA RRNA SPEC QL NAA+PROBE: NOT DETECTED
RPR SER QL: NORMAL

## 2020-12-01 ENCOUNTER — APPOINTMENT (EMERGENCY)
Dept: RADIOLOGY | Facility: HOSPITAL | Age: 30
End: 2020-12-01
Payer: COMMERCIAL

## 2020-12-01 ENCOUNTER — HOSPITAL ENCOUNTER (EMERGENCY)
Facility: HOSPITAL | Age: 30
Discharge: HOME | End: 2020-12-01
Attending: EMERGENCY MEDICINE
Payer: COMMERCIAL

## 2020-12-01 VITALS
HEART RATE: 89 BPM | OXYGEN SATURATION: 98 % | DIASTOLIC BLOOD PRESSURE: 64 MMHG | TEMPERATURE: 98.3 F | RESPIRATION RATE: 18 BRPM | SYSTOLIC BLOOD PRESSURE: 134 MMHG
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
## 2020-12-01 DIAGNOSIS — S83.90XA KNEE SPRAIN: Primary | ICD-10-CM

## 2020-12-01 PROCEDURE — 73564 X-RAY EXAM KNEE 4 OR MORE: CPT

## 2020-12-01 PROCEDURE — 99284 EMERGENCY DEPT VISIT MOD MDM: CPT | Performed by: PHYSICIAN ASSISTANT

## 2020-12-01 PROCEDURE — 99283 EMERGENCY DEPT VISIT LOW MDM: CPT

## 2020-12-01 RX ORDER — IBUPROFEN 600 MG/1
600 TABLET ORAL EVERY 6 HOURS PRN
Qty: 30 TABLET | Refills: 0 | Status: SHIPPED | OUTPATIENT
Start: 2020-12-01 | End: 2020-12-14 | Stop reason: SDUPTHER

## 2020-12-01 RX ORDER — METHOCARBAMOL 500 MG/1
500 TABLET, FILM COATED ORAL 4 TIMES DAILY
Qty: 40 TABLET | Refills: 0 | Status: SHIPPED | OUTPATIENT
Start: 2020-12-01 | End: 2020-12-14 | Stop reason: SDUPTHER

## 2020-12-01 RX ORDER — IBUPROFEN 600 MG/1
600 TABLET ORAL ONCE
Status: COMPLETED | OUTPATIENT
Start: 2020-12-01 | End: 2020-12-01

## 2020-12-01 RX ADMIN — IBUPROFEN 600 MG: 600 TABLET ORAL at 13:59

## 2020-12-14 DIAGNOSIS — S83.90XA KNEE SPRAIN: ICD-10-CM
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
## 2020-12-14 RX ORDER — METHOCARBAMOL 500 MG/1
500 TABLET, FILM COATED ORAL 4 TIMES DAILY
Qty: 40 TABLET | Refills: 0 | Status: SHIPPED | OUTPATIENT
Start: 2020-12-14 | End: 2020-12-28 | Stop reason: SDUPTHER

## 2020-12-14 RX ORDER — IBUPROFEN 600 MG/1
600 TABLET ORAL EVERY 6 HOURS PRN
Qty: 30 TABLET | Refills: 0 | Status: SHIPPED | OUTPATIENT
Start: 2020-12-14 | End: 2020-12-28 | Stop reason: SDUPTHER
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
## 2020-12-28 DIAGNOSIS — S83.90XA KNEE SPRAIN: ICD-10-CM

## 2020-12-28 RX ORDER — METHOCARBAMOL 500 MG/1
500 TABLET, FILM COATED ORAL 4 TIMES DAILY
Qty: 40 TABLET | Refills: 0 | Status: SHIPPED | OUTPATIENT
Start: 2020-12-28 | End: 2021-09-13 | Stop reason: ALTCHOICE

## 2020-12-28 RX ORDER — IBUPROFEN 600 MG/1
600 TABLET ORAL EVERY 6 HOURS PRN
Qty: 30 TABLET | Refills: 0 | Status: SHIPPED | OUTPATIENT
Start: 2020-12-28 | End: 2021-09-13 | Stop reason: ALTCHOICE

## 2021-09-13 ENCOUNTER — OFFICE VISIT (OUTPATIENT)
Dept: FAMILY MEDICINE CLINIC | Facility: CLINIC | Age: 31
End: 2021-09-13

## 2021-09-13 VITALS
BODY MASS INDEX: 26.36 KG/M2 | WEIGHT: 178 LBS | SYSTOLIC BLOOD PRESSURE: 118 MMHG | HEIGHT: 69 IN | HEART RATE: 80 BPM | RESPIRATION RATE: 12 BRPM | DIASTOLIC BLOOD PRESSURE: 68 MMHG | TEMPERATURE: 98.3 F | OXYGEN SATURATION: 97 %

## 2021-09-13 DIAGNOSIS — F41.8 ANXIETY WITH DEPRESSION: ICD-10-CM

## 2021-09-13 DIAGNOSIS — Z00.00 WELL ADULT EXAM: Primary | ICD-10-CM

## 2021-09-13 DIAGNOSIS — Z23 NEED FOR TDAP VACCINATION: ICD-10-CM

## 2021-09-13 DIAGNOSIS — Z11.3 SCREENING FOR STD (SEXUALLY TRANSMITTED DISEASE): ICD-10-CM

## 2021-09-13 DIAGNOSIS — R30.0 DYSURIA: ICD-10-CM

## 2021-09-13 PROCEDURE — 90715 TDAP VACCINE 7 YRS/> IM: CPT

## 2021-09-13 PROCEDURE — 99395 PREV VISIT EST AGE 18-39: CPT | Performed by: FAMILY MEDICINE

## 2021-09-13 PROCEDURE — 90471 IMMUNIZATION ADMIN: CPT

## 2021-09-13 NOTE — ASSESSMENT & PLAN NOTE
Recommended psychotherapy  Patient will schedule appointment with Carleen Garcia LCSW  Call office if symptoms persist or worsen

## 2021-09-13 NOTE — PROGRESS NOTES
Chief Complaint   Patient presents with    Physical Exam     Annual Physical     Health Maintenance   Topic Date Due    COVID-19 Vaccine (1) Never done    BMI: Followup Plan  03/10/2021    Annual Physical  03/10/2021    Influenza Vaccine (1) 09/01/2021    BMI: Adult  09/13/2022    DTaP,Tdap,and Td Vaccines (8 - Td or Tdap) 09/13/2031    HIV Screening  Completed    Hepatitis C Screening  Completed    HIB Vaccine  Completed    Hepatitis B Vaccine  Completed    IPV Vaccine  Completed    Pneumococcal Vaccine: Pediatrics (0 to 5 Years) and At-Risk Patients (6 to 59 Years)  Aged Out    Hepatitis A Vaccine  Aged Out    Meningococcal ACWY Vaccine  Aged Out    HPV Vaccine  Aged Out         BMI Counseling: Body mass index is 26 67 kg/m²  The BMI is above normal  Nutrition recommendations include decreasing portion sizes, encouraging healthy choices of fruits and vegetables, decreasing fast food intake, consuming healthier snacks, limiting drinks that contain sugar, moderation in carbohydrate intake, increasing intake of lean protein, reducing intake of saturated and trans fat and reducing intake of cholesterol  Exercise recommendations include exercising 3-5 times per week  No pharmacotherapy was ordered  Assessment/Plan:    Well adult exam  Recommended patient to follow a well balanced diet, start regular exercise  Stop smoking  Avoid using elicit drugs  Counseled on safe sex practices  Tdap administered today  Patient declined Flu vaccination  Recommended to schedule COVID vaccination  Follow-up with a dentist regularly  Screening for STD (sexually transmitted disease)  Patient was treated for Chlamydia infection in 2019  C/o yellowish penile discharge, mild dysuria  Will screen for STI's  Will check urinalysis and urine culture to rule out UTI  Use condoms regularly  Anxiety with depression  Recommended psychotherapy      Patient will schedule appointment with Ayana Cross LCSW  Call office if symptoms persist or worsen  Dysuria  Recommended to stay well hydrated  Will check urinalysis and urine culture to rule out UTI  Schedule annual physical exam in 1 year  Call office with any acute problems  Diagnoses and all orders for this visit:    Well adult exam    Screening for STD (sexually transmitted disease)  -     HIV 1/2 Antigen/Antibody (4th Generation) w Reflex [facility]; Future  -     Hepatitis C antibody; Future  -     Chlamydia/GC amplified DNA by PCR; Future  -     Hepatitis B surface antigen; Future    Anxiety with depression  -     Ambulatory referral to behavioral health therapists; Future    Dysuria  -     Urinalysis with microscopic  -     Urine culture; Future    Need for Tdap vaccination  -     TDAP VACCINE GREATER THAN OR EQUAL TO 6YO IM          Subjective:      Patient ID: Lin Malave is a 36 y o  male  HPI     Patient presents for annual physical exam       He works full time  He does not exercise on a regular basis  Patient like to get tested for STI's  He has history of Chlamydia infection in 2019  C/o yellowish penile discharge, discomfort with urination, had unprotected intercourse a few times  Patient smokes cigarettes occasionally and smokes marijuana  Denies alcohol use  C/o issues with anxiety, feels depressed at times  No SI, no HI  Patient was in [address] in December 2020  He goes to a chiropractor 3 times per week  C/o mid- back muscle spasms  Family history is positive for diabetes in maternal grandmother and HTN  in his father  The following portions of the patient's history were reviewed and updated as appropriate: allergies, current medications, past medical history, past social history, past surgical history and problem list     Review of Systems   Constitutional: Positive for fatigue (mild)  Negative for activity change, appetite change, chills and fever     HENT: Negative for congestion, dental problem, ear pain, hearing loss, mouth sores, nosebleeds, sore throat, tinnitus and trouble swallowing  Eyes: Negative for pain, discharge, redness, itching and visual disturbance  Respiratory: Negative for cough, chest tightness, shortness of breath and wheezing  Cardiovascular: Negative for chest pain, palpitations and leg swelling  Gastrointestinal: Negative for abdominal pain, blood in stool, constipation, diarrhea, nausea and rectal pain  Genitourinary: Positive for discharge and dysuria  Negative for difficulty urinating, flank pain, frequency, genital sores, hematuria, testicular pain and urgency  Musculoskeletal: Positive for back pain  Negative for arthralgias (mid-back), joint swelling, myalgias and neck pain  Skin: Negative for rash  Neurological: Negative for dizziness, syncope and headaches  Hematological: Negative  Psychiatric/Behavioral: Positive for sleep disturbance  Negative for suicidal ideas  Anxiety / feels mildly depressed at times         Objective:      /68 (BP Location: Left arm, Patient Position: Sitting, Cuff Size: Adult)   Pulse 80   Temp 98 3 °F (36 8 °C) (Tympanic)   Resp 12   Ht 5' 8 5" (1 74 m)   Wt 80 7 kg (178 lb)   SpO2 97%   BMI 26 67 kg/m²          Physical Exam  Vitals reviewed  Constitutional:       Appearance: Normal appearance  HENT:      Head: Normocephalic and atraumatic  Right Ear: Tympanic membrane and external ear normal       Left Ear: Tympanic membrane and external ear normal       Nose: No congestion  Eyes:      Pupils: Pupils are equal, round, and reactive to light  Cardiovascular:      Rate and Rhythm: Normal rate and regular rhythm  Heart sounds: No murmur heard  Pulmonary:      Effort: Pulmonary effort is normal       Breath sounds: Normal breath sounds  Abdominal:      General: Abdomen is flat  Bowel sounds are normal  There is no distension        Palpations: Abdomen is soft  Tenderness: There is no abdominal tenderness  Musculoskeletal:         General: No swelling, tenderness or deformity  Normal range of motion  Cervical back: Normal range of motion and neck supple  Right lower leg: No edema  Left lower leg: No edema  Lymphadenopathy:      Cervical: No cervical adenopathy  Skin:     General: Skin is warm  Findings: No rash  Neurological:      Mental Status: He is alert  Motor: No weakness        Gait: Gait normal    Psychiatric:         Mood and Affect: Mood normal          Behavior: Behavior normal

## 2021-09-13 NOTE — ASSESSMENT & PLAN NOTE
Recommended patient to follow a well balanced diet, start regular exercise  Stop smoking  Avoid using elicit drugs  Counseled on safe sex practices  Tdap administered today  Patient declined Flu vaccination  Recommended to schedule COVID vaccination  Follow-up with a dentist regularly

## 2022-05-03 ENCOUNTER — OFFICE VISIT (OUTPATIENT)
Dept: FAMILY MEDICINE CLINIC | Facility: CLINIC | Age: 32
End: 2022-05-03
Payer: COMMERCIAL

## 2022-05-03 VITALS
DIASTOLIC BLOOD PRESSURE: 72 MMHG | TEMPERATURE: 99.1 F | HEIGHT: 69 IN | WEIGHT: 171 LBS | SYSTOLIC BLOOD PRESSURE: 100 MMHG | RESPIRATION RATE: 16 BRPM | BODY MASS INDEX: 25.33 KG/M2 | HEART RATE: 80 BPM

## 2022-05-03 DIAGNOSIS — M77.9 BONE SPUR: Primary | ICD-10-CM

## 2022-05-03 PROCEDURE — 99213 OFFICE O/P EST LOW 20 MIN: CPT | Performed by: FAMILY MEDICINE

## 2022-05-03 PROCEDURE — 1036F TOBACCO NON-USER: CPT | Performed by: FAMILY MEDICINE

## 2022-05-03 PROCEDURE — 3008F BODY MASS INDEX DOCD: CPT | Performed by: FAMILY MEDICINE

## 2022-05-03 NOTE — ASSESSMENT & PLAN NOTE
Patient most likely has osteophyte formation on lateral left wrist and 4th MCP joint  Does have some history of trauma which might explain this  Will order x-ray to evaluate for osteophyte formation  Will refer to hand surgeon if needed for further evaluation possible surgical excision if needed      Follow-up as needed

## 2022-05-03 NOTE — PROGRESS NOTES
Assessment/Plan:    Bone spur  Patient most likely has osteophyte formation on lateral left wrist and 4th MCP joint  Does have some history of trauma which might explain this  Will order x-ray to evaluate for osteophyte formation  Will refer to hand surgeon if needed for further evaluation possible surgical excision if needed  Follow-up as needed       Diagnoses and all orders for this visit:    Bone spur  -     XR hand 3+ vw left; Future          Subjective:   Chief Complaint   Patient presents with    Mass     small     Health Maintenance   Topic Date Due    COVID-19 Vaccine (1) Never done    BMI: Followup Plan  09/13/2022    Influenza Vaccine (Season Ended) 09/01/2022    Annual Physical  09/13/2022    BMI: Adult  05/03/2023    DTaP,Tdap,and Td Vaccines (8 - Td or Tdap) 09/13/2031    HIV Screening  Completed    Hepatitis C Screening  Completed    HIB Vaccine  Completed    Hepatitis B Vaccine  Completed    IPV Vaccine  Completed    Pneumococcal Vaccine: Pediatrics (0 to 5 Years) and At-Risk Patients (6 to 59 Years)  Aged Out    Hepatitis A Vaccine  Aged Out    Meningococcal ACWY Vaccine  Aged Out    HPV Vaccine  Aged Out        Patient ID: Ame Pederson is a 36 y o  male  HPI    Patient has noticed a left wrist bones per on the lateral aspect of the wrist along the tendon  1st noticed over the past 2 weeks  Nontender  Trauma about 1 year ago after he hit it against something possibly metallic while he worked at [employer]  Noticed it was bruise at the time  No lacerations or open wounds  No loss of strength or other neurological deficits  Also has noticed a bony growth left ring finger at the MCP joint  Has noticed that 1 for about a year to year and a half  No known trauma        The following portions of the patient's history were reviewed and updated as appropriate: allergies, current medications, past family history, past medical history, past social history, past surgical history, and problem list     Review of Systems   Constitutional: Negative for chills and fever  HENT: Negative for congestion  Eyes: Negative for visual disturbance  Respiratory: Negative for cough  Cardiovascular: Negative for chest pain and palpitations  Gastrointestinal: Negative for nausea and vomiting  Musculoskeletal: Negative for myalgias  Bumps on left hand   Skin: Negative for rash  Neurological: Negative for dizziness and headaches  Objective:  /72   Pulse 80   Temp 99 1 °F (37 3 °C) (Tympanic)   Resp 16   Ht 5' 8 5" (1 74 m)   Wt 77 6 kg (171 lb)   BMI 25 62 kg/m²      Physical Exam  Vitals and nursing note reviewed  Constitutional:       General: He is not in acute distress  HENT:      Head: Normocephalic and atraumatic  Cardiovascular:      Rate and Rhythm: Normal rate and regular rhythm  Pulses: Normal pulses  Heart sounds: No murmur heard  Pulmonary:      Effort: Pulmonary effort is normal  No respiratory distress  Breath sounds: No wheezing or rales  Musculoskeletal:      Comments: Small hard mass noted on lateral aspect left wrist and at 4th MCP joint   Neurological:      Mental Status: He is alert  This note has been constructed using a voice recognition system  There may be translation, syntax, or grammatical errors  If you have an questions, please contact the dictating provider

## 2022-09-10 PROBLEM — R30.0 DYSURIA: Status: RESOLVED | Noted: 2021-09-13 | Resolved: 2022-09-10

## 2022-09-13 ENCOUNTER — OFFICE VISIT (OUTPATIENT)
Dept: FAMILY MEDICINE CLINIC | Facility: CLINIC | Age: 32
End: 2022-09-13
Payer: COMMERCIAL

## 2022-09-13 VITALS
WEIGHT: 164.6 LBS | DIASTOLIC BLOOD PRESSURE: 68 MMHG | BODY MASS INDEX: 24.95 KG/M2 | RESPIRATION RATE: 16 BRPM | TEMPERATURE: 97.9 F | HEIGHT: 68 IN | OXYGEN SATURATION: 98 % | HEART RATE: 86 BPM | SYSTOLIC BLOOD PRESSURE: 116 MMHG

## 2022-09-13 DIAGNOSIS — F41.8 ANXIETY WITH DEPRESSION: ICD-10-CM

## 2022-09-13 DIAGNOSIS — Z72.0 TOBACCO USE: ICD-10-CM

## 2022-09-13 DIAGNOSIS — Z11.3 SCREENING FOR STD (SEXUALLY TRANSMITTED DISEASE): ICD-10-CM

## 2022-09-13 DIAGNOSIS — Z00.00 WELL ADULT EXAM: Primary | ICD-10-CM

## 2022-09-13 DIAGNOSIS — F32.A MILD DEPRESSION: ICD-10-CM

## 2022-09-13 PROCEDURE — 99395 PREV VISIT EST AGE 18-39: CPT | Performed by: FAMILY MEDICINE

## 2022-09-13 PROCEDURE — 3725F SCREEN DEPRESSION PERFORMED: CPT | Performed by: FAMILY MEDICINE

## 2022-09-13 NOTE — PROGRESS NOTES
Name: Nieves Kumar      : 1990      MRN: [id number]  Encounter Provider: Annia Smith MD  Encounter Date: 2022   Encounter department: [facility] [address]  Chief Complaint   Patient presents with    Physical Exam     Annual physical      Health Maintenance   Topic Date Due    COVID-19 Vaccine (1) Never done    Influenza Vaccine (1) 2022    BMI: Followup Plan  2023    BMI: Adult  2023    Annual Physical  2023    DTaP,Tdap,and Td Vaccines (8 - Td or Tdap) 2031    HIV Screening  Completed    Hepatitis C Screening  Completed    HIB Vaccine  Completed    Hepatitis B Vaccine  Completed    IPV Vaccine  Completed    Pneumococcal Vaccine: Pediatrics (0 to 5 Years) and At-Risk Patients (6 to 59 Years)  Aged Out    Hepatitis A Vaccine  Aged Out    Meningococcal ACWY Vaccine  Aged Out    HPV Vaccine  Aged Out       Assessment & Plan     1  Well adult exam  Assessment & Plan:  Recommended to follow a well-balanced diet, regular exercise, regular dentist visit  Recommended to avoid junk/ processed foods  Encouraged to eat fruits, vegetables  Recommended to stop smoking cigarettes  Avoid using elicit drugs  Counseled on safe sex practices  Use condoms for STI protection  Patient declined Influenza and COVID vaccination  2  Anxiety with depression  Assessment & Plan:  Mood has been stable  Consider psychotherapy  Recommended to call office if symptoms persist or worsen  3  Screening for STD (sexually transmitted disease)  Assessment & Plan:  Labs ordered  Recommended to use condoms for STI protection  Orders:  -     HIV 1/2 Antigen/Antibody (4th Generation) w Reflex [facility]; Future  -     Hepatitis C antibody; Future  -     Chlamydia/GC amplified DNA by PCR; Future  -     Hepatitis B surface antigen; Future    4  Mild depression  Assessment & Plan:  PHQ-9 score 7    Patient declined starting psychotherapy at this time     He will call office if decides to see a therapist     Recommended to follow a well-balanced diet, regular exercise  5  Tobacco use  Assessment & Plan:  Counseled patient on smoking cessation  Schedule physical exam in 1 year  Call office with any acute problems  BMI Counseling: Body mass index is 25 03 kg/m²  The BMI is above normal  Nutrition recommendations include encouraging healthy choices of fruits and vegetables, decreasing fast food intake, consuming healthier snacks, limiting drinks that contain sugar, moderation in carbohydrate intake, increasing intake of lean protein, reducing intake of saturated and trans fat and reducing intake of cholesterol  Exercise recommendations include exercising 3-5 times per week  No pharmacotherapy was ordered  Rationale for BMI follow-up plan is due to patient being overweight or obese  Subjective      HPI     Patient presents for annual physical exam     He works full time  Reports no chest pain, shortness of breath, dizziness  Patient states that he smokes 10 cigarettes per week, smokes marijuana  Denies alcohol use  Patient did not get tested for STI'ss as ordered at the last visit in September 2021  He recently had unprotected sex with a female who went to her gynecologist and was diagnosed with Chlamydia infection  Patient was prescribed treatment for Chlamydia, has 2 tablets left to complete the course  Reports no urinary frequency, burning on urination, had some mild yellowish penile discharge which has resolved  Patient has history of anxiety, feels depressed at times  Reports no SI, HI  He was in [address] in December 2020  C/o occasional back pain  Patient lost 14 lb since last visit in September 2021  He states that due to financial issues he has not been following a healthy diet  Refusing Flu shot, COVID vaccination    Tdap done in September 2021        Family history is positive for diabetes in maternal grandmother and hypertension in his father  Review of Systems   Constitutional: Positive for fatigue  Negative for activity change, appetite change, chills and fever  Diaphoresis: mild  HENT: Negative for congestion, ear pain, nosebleeds, sore throat and trouble swallowing  Eyes: Negative  Respiratory: Negative for cough, chest tightness, shortness of breath and wheezing  Cardiovascular: Negative for chest pain, palpitations and leg swelling  Gastrointestinal: Negative for abdominal pain, blood in stool, constipation, diarrhea, nausea and vomiting  Genitourinary: Positive for penile discharge (resolved)  Negative for difficulty urinating, dysuria, flank pain and hematuria  Musculoskeletal: Positive for back pain (occasional )  Negative for arthralgias, gait problem, joint swelling, myalgias and neck pain  Skin: Negative for rash  Neurological: Negative for dizziness, syncope and headaches  Hematological: Negative  Psychiatric/Behavioral: Negative for sleep disturbance and suicidal ideas  Anxiety - mood has been stable       No current outpatient medications on file prior to visit  Objective     /68 (BP Location: Left arm, Patient Position: Sitting)   Pulse 86   Temp 97 9 °F (36 6 °C) (Tympanic)   Resp 16   Ht 5' 8" (1 727 m)   Wt 74 7 kg (164 lb 9 6 oz)   SpO2 98%   BMI 25 03 kg/m²     Physical Exam  Vitals and nursing note reviewed  Constitutional:       Appearance: Normal appearance  HENT:      Head: Normocephalic and atraumatic  Eyes:      Conjunctiva/sclera: Conjunctivae normal       Pupils: Pupils are equal, round, and reactive to light  Cardiovascular:      Rate and Rhythm: Normal rate and regular rhythm  Heart sounds: No murmur heard  Pulmonary:      Effort: Pulmonary effort is normal       Breath sounds: Normal breath sounds  Abdominal:      General: Abdomen is flat   Bowel sounds are normal  There is no distension  Palpations: Abdomen is soft  Tenderness: There is no abdominal tenderness  Musculoskeletal:         General: No swelling, tenderness or deformity  Normal range of motion  Cervical back: Normal range of motion and neck supple  Right lower leg: No edema  Left lower leg: No edema  Skin:     General: Skin is warm and dry  Findings: No rash  Neurological:      General: No focal deficit present  Mental Status: He is alert  Motor: No weakness     Psychiatric:         Mood and Affect: Mood normal          Behavior: Behavior normal        Elías Mcknight MD

## 2022-09-13 NOTE — ASSESSMENT & PLAN NOTE
Recommended to follow a well-balanced diet, regular exercise, regular dentist visit  Recommended to avoid junk/ processed foods  Encouraged to eat fruits, vegetables  Recommended to stop smoking cigarettes  Avoid using elicit drugs  Counseled on safe sex practices  Use condoms for STI protection  Patient declined Influenza and COVID vaccination

## 2022-09-13 NOTE — ASSESSMENT & PLAN NOTE
PHQ-9 score 7  Patient declined starting psychotherapy at this time  He will call office if decides to see a therapist     Recommended to follow a well-balanced diet, regular exercise

## 2022-09-13 NOTE — ASSESSMENT & PLAN NOTE
Mood has been stable  Consider psychotherapy  Recommended to call office if symptoms persist or worsen

## 2022-10-18 LAB
C TRACH RRNA SPEC QL NAA+PROBE: NOT DETECTED
HBV SURFACE AG SERPL QL IA: NORMAL
HCV AB S/CO SERPL IA: 0.04
HCV AB SERPL QL IA: NORMAL
HIV 1+2 AB+HIV1 P24 AG SERPL QL IA: NORMAL
N GONORRHOEA RRNA SPEC QL NAA+PROBE: NOT DETECTED

## 2022-12-05 ENCOUNTER — OFFICE VISIT (OUTPATIENT)
Dept: FAMILY MEDICINE CLINIC | Facility: CLINIC | Age: 32
End: 2022-12-05

## 2022-12-05 VITALS
HEART RATE: 76 BPM | WEIGHT: 169 LBS | RESPIRATION RATE: 16 BRPM | DIASTOLIC BLOOD PRESSURE: 72 MMHG | BODY MASS INDEX: 25.61 KG/M2 | TEMPERATURE: 97.9 F | SYSTOLIC BLOOD PRESSURE: 118 MMHG | HEIGHT: 68 IN

## 2022-12-05 DIAGNOSIS — R10.11 RIGHT UPPER QUADRANT PAIN: ICD-10-CM

## 2022-12-05 DIAGNOSIS — R14.0 BLOATING: Primary | ICD-10-CM

## 2022-12-05 NOTE — ASSESSMENT & PLAN NOTE
The patient with a one-week history of right upper quadrant pain which radiates occasionally down to his groin  In addition the patient is having abdominal bloating  He does have episodes of constipation  He has had an episode of blood in his stool after straining to have a bowel movement  He denies any nausea, vomiting or diarrhea  He denies any change in urination  He was recently tested for STDs and this was negative  He is in no acute distress in the office today  An ultrasound of the abdomen has been ordered  Information regarding constipation was provided to the patient

## 2022-12-05 NOTE — PATIENT INSTRUCTIONS
Constipation   WHAT YOU NEED TO KNOW:   Constipation means you have hard, dry bowel movements, or you go longer than usual between bowel movements  DISCHARGE INSTRUCTIONS:   Call your doctor if:   You have blood in your bowel movements  You have a fever and abdominal pain with the constipation  Your constipation gets worse  You start to vomit  You have questions or concerns about your condition or care  Medicines:   Medicine  such as a laxative may help relax and loosen your intestines to help you have a bowel movement  Your provider may recommend you only use laxatives for a short time  Long-term use may make your bowels dependent on the medicine  Take your medicine as directed  Contact your healthcare provider if you think your medicine is not helping or if you have side effects  Tell him of her if you are allergic to any medicine  Keep a list of the medicines, vitamins, and herbs you take  Include the amounts, and when and why you take them  Bring the list or the pill bottles to follow-up visits  Carry your medicine list with you in case of an emergency  Relieve constipation:   A suppository  may be used to help soften your bowel movements  This may make them easier to pass  A suppository is guided into your rectum through your anus  An enema  is liquid medicine used to clear bowel movement from your rectum  The medicine is put into your rectum through your anus  Prevent constipation:   Drink liquids as directed  You may need to drink extra liquids to help soften and move your bowels  Ask how much liquid to drink each day and which liquids are best for you  Eat high-fiber foods  This may help decrease constipation by adding bulk to your bowel movements  High-fiber foods include fruit, vegetables, whole-grain breads and cereals, and beans  Your healthcare provider or dietitian can help you create a high-fiber meal plan   Your provider may also recommend a fiber supplement if you cannot get enough fiber from food  Exercise regularly  Regular physical activity can help stimulate your intestines  Walking is a good exercise to prevent or relieve constipation  Ask which exercises are best for you  Schedule a time each day to have a bowel movement  This may help train your body to have regular bowel movements  Bend forward while you are on the toilet to help move the bowel movement out  Sit on the toilet for at least 10 minutes, even if you do not have a bowel movement  Talk to your healthcare provider about your medicines  Certain medicines, such as opioids, can cause constipation  Your provider may be able to make medicine changes  For example, he or she may change the kind of medicine, or change when you take it  Follow up with your doctor as directed:  Write down your questions so you remember to ask them during your visits  © Copyright [employer] 2022 Information is for End User's use only and may not be sold, redistributed or otherwise used for commercial purposes  All illustrations and images included in CareNotes® are the copyrighted property of A D A M , Inc  or [facility] Alisha Cid   The above information is an  only  It is not intended as medical advice for individual conditions or treatments  Talk to your doctor, nurse or pharmacist before following any medical regimen to see if it is safe and effective for you

## 2022-12-05 NOTE — PROGRESS NOTES
[facility]    NAME: Valentina Currie  AGE: 29 y o  SEX: male  : 1990     DATE: 2022     Assessment and Plan:     Problem List Items Addressed This Visit        Other    Bloating - Primary     The patient with a one-week history of right upper quadrant pain which radiates occasionally down to his groin  In addition the patient is having abdominal bloating  He does have episodes of constipation  He has had an episode of blood in his stool after straining to have a bowel movement  He denies any nausea, vomiting or diarrhea  He denies any change in urination  He was recently tested for STDs and this was negative  He is in no acute distress in the office today  An ultrasound of the abdomen has been ordered  Information regarding constipation was provided to the patient  Relevant Orders    US abdomen complete   Other Visit Diagnoses     Right upper quadrant pain        Relevant Orders    US abdomen complete              No follow-ups on file  Chief Complaint:     Chief Complaint   Patient presents with   • Pain     Lower back and abdominal         History of Present Illness: One week history of right flank pain which radiates to right groin with pressure  Every day for the last week  No problems with urination  Issues with constipation off and on  Ultrasound of abdomen ordered  Review of Systems:     Review of Systems   Constitutional: Negative  Negative for fatigue  HENT: Negative  Negative for congestion, postnasal drip, rhinorrhea and trouble swallowing  Eyes: Negative  Negative for visual disturbance  Respiratory: Negative  Negative for choking and shortness of breath  Cardiovascular: Negative  Negative for chest pain  Gastrointestinal: Positive for abdominal pain (pain and bloating) and constipation  Endocrine: Negative  Genitourinary: Negative  Musculoskeletal: Negative    Negative for arthralgias, back pain, myalgias and neck pain  Skin: Negative  Neurological: Negative for dizziness and headaches  Psychiatric/Behavioral: Negative  Problem List:     Patient Active Problem List   Diagnosis   • Screening for STD (sexually transmitted disease)   • Well adult exam   • Anxiety with depression   • Bone spur   • Mild depression   • Tobacco use   • Bloating        Objective:     /72   Pulse 76   Temp 97 9 °F (36 6 °C) (Tympanic)   Resp 16   Ht 5' 8" (1 727 m)   Wt 76 7 kg (169 lb)   BMI 25 70 kg/m²     No current outpatient medications on file  No current facility-administered medications for this visit  Physical Exam  Vitals reviewed  Constitutional:       Appearance: Normal appearance  HENT:      Head: Normocephalic and atraumatic  Nose: Nose normal       Mouth/Throat:      Mouth: Mucous membranes are moist    Eyes:      Extraocular Movements: Extraocular movements intact  Pupils: Pupils are equal, round, and reactive to light  Cardiovascular:      Rate and Rhythm: Normal rate and regular rhythm  Pulses: Normal pulses  Heart sounds: Normal heart sounds  Pulmonary:      Effort: Pulmonary effort is normal       Breath sounds: Normal breath sounds  Abdominal:      Tenderness: There is abdominal tenderness (right upper quadrant)  Musculoskeletal:         General: Normal range of motion  Skin:     General: Skin is warm  Neurological:      General: No focal deficit present  Mental Status: He is alert and oriented to person, place, and time  Psychiatric:         Mood and Affect: Mood normal          Behavior: Behavior normal          Thought Content:  Thought content normal          Judgment: Judgment normal          Mary Kay Duong, [id number] [address]

## 2022-12-09 ENCOUNTER — HOSPITAL ENCOUNTER (OUTPATIENT)
Dept: ULTRASOUND IMAGING | Facility: HOSPITAL | Age: 32
Discharge: HOME | End: 2022-12-09

## 2022-12-09 DIAGNOSIS — R14.0 BLOATING: ICD-10-CM

## 2022-12-09 DIAGNOSIS — R10.11 RIGHT UPPER QUADRANT PAIN: ICD-10-CM

## 2022-12-13 DIAGNOSIS — K76.0 FATTY LIVER: Primary | ICD-10-CM

## 2022-12-19 ENCOUNTER — OFFICE VISIT (OUTPATIENT)
Dept: GASTROENTEROLOGY | Facility: CLINIC | Age: 32
End: 2022-12-19

## 2022-12-19 VITALS
HEART RATE: 80 BPM | TEMPERATURE: 97.2 F | HEIGHT: 69 IN | SYSTOLIC BLOOD PRESSURE: 122 MMHG | WEIGHT: 160.8 LBS | DIASTOLIC BLOOD PRESSURE: 90 MMHG | OXYGEN SATURATION: 99 % | BODY MASS INDEX: 23.82 KG/M2

## 2022-12-19 DIAGNOSIS — R16.0 HEPATOMEGALY: ICD-10-CM

## 2022-12-19 DIAGNOSIS — K76.0 FATTY LIVER: ICD-10-CM

## 2022-12-19 DIAGNOSIS — K62.5 RECTAL BLEEDING: ICD-10-CM

## 2022-12-19 DIAGNOSIS — K59.00 CONSTIPATION, UNSPECIFIED CONSTIPATION TYPE: Primary | ICD-10-CM

## 2022-12-19 NOTE — PROGRESS NOTES
[facility] - Outpatient Consultation  Kimberlee Mcnair 29 y o  male MRN: [id number]  Encounter: [id number]          ASSESSMENT AND PLAN:      1  Fatty liver  2  Hepatomegaly  Patiently initially with RUQ abdominal discomfort, which has since resolved, prompting an abdominal U/S notable for mild hepatomegaly (16 5 cm) and mild hepatic steatosis  No previous hepatic function studies, or other basic serologies available for review  No clinical or serologic evidence to suggest chronic liver disease  Although patient's with a normal BMI and no additional metabolic risk factors, it is likely that he has lean TAVARES in the setting of a consistently poor diet  Recommend checking basic serologies (CBC, CMP, INR) in addition to completing his viral hepatitis serologies and assessing for protective immunity to hepatitis A and B  Patient will also complete an ultrasound elastography to better quantify steatosis and assess for advanced fibrosis  Given patient's young age and limited metabolic risk factors, would also recommend checking A1AT levels and phenotype to evaluate for a predisposition for progressive advancement of TAVARES  Discussed the pathophysiology of fatty liver disease, and patient is aware of the potential to progress to cirrhosis over an extended time if left untreated  Discussed recommendations in regards to fatty liver including maintaining a stable weight and implementing a healthier diet low in fat, limiting his alcohol consumption, and avoiding hepatotoxic medications  Also recommended to avoid herbal supplements and liver cleanses, as these are not typically FDA approved and could potentially be hepatotoxic  NAFLD fibrosis score could not be calculated  - Ambulatory Referral to Hepatology  - Alpha 1 Antitrypsin Phenotype; Future  - Alpha-1-antitrypsin;  Future  - Hepatitis A antibody, total; Future  - Hepatitis B core antibody, total; Future  - Hepatitis B surface antibody; Future  - CBC and differential; Future  - Comprehensive metabolic panel; Future  - Protime-INR; Future  - US elastography/UGAP; Future    2  Constipation, unspecified constipation type  3  Rectal bleeding  Patient with a brief period of constipation with an episode of scant blood-tinged mucus on brown stool, many months prior in the setting of significant straining and now resolved  Patient admits to regular bowel habits including 1 soft but formed to complete BM daily  Denies any additional rectal bleeding, alarm features, or family history of colorectal cancer  Discussed that the rectal bleeding he experienced is most consistent with anorectal etiology such as a hemorrhoid or anal fissure  Discussed treatment options including 1) colonoscopy with random biopsy for complete evaluation 2) conservative management by preventing constipation with over-the-counter laxatives and close monitoring of his stools for recurrent bleeding  Patient prefers to defer colonoscopy, optimize his constipation, and monitor for any additional rectal bleeding - No objections  If patient were to experience rectal bleeding or other alarm sxs's as discussed, advised to promptly inform provider and would likely recommend an expedited colonoscopy  Otherwise, recommended a trial of MiraLAX as needed, as well as, the importance of meeting his dietary requirements for daily fiber and fluid intake to prevent constipation  Follow-up in 6 months      ______________________________________________________________________    HPI: Patient is a 29 y o  male with PMH significant for anxiety and depression and tobacco use who presents today for a consultation regarding fatty liver noted on imaging       Patient was seen by his primary care provider with complaints regarding right upper quadrant abdominal pain wrapping around to the right side of his back, for which a subsequent abdominal ultrasound revealed mild hepatic steatosis  Hep C Ab and Hep B Ag negative, but otherwise no previous serologies available for review  Patient denies excessive EtOH use, stating that he drinks a few times monthly having 3-4 beers within a setting  Denies IV drug use  Denies a family history of liver disease  Denies a personal or family history of autoimmune disorders  Denies taking any regular medications, but has recently started taking a liver tea cleanse and flaxseed oil  Denies any known infection with viral hepatitis  Patient reports being born in the [place]  Despite normal BMI (23 75), patient does report an extremely poor diet within the last year  States he would eat "a burger a day", in addition to other high-fat and greasy fatty fried foods  Patient also notes constipation with an episode of scant blood-tinged mucus on the outside of his of brown stools in the setting of significant straining to have a bowel movement  Patient states that this happened once many months ago, and has not happened since resolution of his constipation  Admits to having 1 soft but formed and brown complete bowel movement a day  Denies a family history of colorectal cancer  REVIEW OF SYSTEMS:    CONSTITUTIONAL: Denies any fever, chills, rigors, and weight loss  HEENT: No earache or tinnitus  Denies hearing loss or visual disturbances  CARDIOVASCULAR: No chest pain or palpitations  RESPIRATORY: Denies any cough, hemoptysis, shortness of breath or dyspnea on exertion  GASTROINTESTINAL: As noted in the History of Present Illness  GENITOURINARY: No problems with urination  Denies any hematuria or dysuria  NEUROLOGIC: No dizziness or vertigo, denies headaches  MUSCULOSKELETAL: Denies any muscle or joint pain  SKIN: Denies skin rashes or itching  ENDOCRINE: Denies excessive thirst  Denies intolerance to heat or cold  PSYCHOSOCIAL: Denies depression or anxiety  Denies any recent memory loss         Historical Information   Past Medical History:   Diagnosis Date   • Anxiety    • Depression    • No known problems      Past Surgical History:   Procedure Laterality Date   • SKIN GRAFT       Social History   Social History     Substance and Sexual Activity   Alcohol Use Not Currently    Comment: occasionally     Social History     Substance and Sexual Activity   Drug Use Yes   • Types: Marijuana    Comment: States has cut back on use     Social History     Tobacco Use   Smoking Status Former   Smokeless Tobacco Never     Family History   Problem Relation Age of Onset   • Hypertension Father    • Diabetes Maternal Grandmother        Meds/Allergies     No current outpatient medications on file  No Known Allergies        Objective     Blood pressure 122/90, pulse 80, temperature (!) 97 2 °F (36 2 °C), temperature source Tympanic, height 5' 9" (1 753 m), weight 72 9 kg (160 lb 12 8 oz), SpO2 99 %  Body mass index is 23 75 kg/m²  PHYSICAL EXAM:      General Appearance:   Alert, cooperative, no distress   HEENT:   Normocephalic, atraumatic, anicteric  Neck:  Supple, symmetrical, trachea midline   Lungs:   Clear to auscultation bilaterally; no rales, rhonchi or wheezing; respirations unlabored    Heart[de-identified]   Regular rate and rhythm; no murmur, rub, or gallop  Abdomen:   Soft, non-tender, non-distended; normal bowel sounds; no masses, no organomegaly    Genitalia:   Deferred    Rectal:   Deferred    Extremities:  No cyanosis, clubbing or edema    Pulses:  2+ and symmetric    Skin:  No jaundice, rashes, or lesions    Lymph nodes:  No palpable cervical lymphadenopathy        Lab Results:   No visits with results within 1 Day(s) from this visit     Latest known visit with results is:   Orders Only on 10/17/2022   Component Date Value   • HIV AG/AB, 4th Gen 10/17/2022 NON-REACTIVE    • HBsAg Screen 10/17/2022 NON-REACTIVE    • HEP C AB 10/17/2022 NON-REACTIVE    • Signal to Cut-Off 10/17/2022 0 04    • Chlamydia Trachomatis RN* 10/17/2022 NOT DETECTED    • Neisseria Gonorrhoeae RN* 10/17/2022 NOT DETECTED    • Always Message 10/17/2022           Radiology Results:   US abdomen complete    Result Date: 12/12/2022  Narrative: ABDOMEN ULTRASOUND, COMPLETE INDICATION:   R14 0: Abdominal distension (gaseous) R10 11: Right upper quadrant pain  COMPARISON:  None TECHNIQUE:   Real-time ultrasound of the abdomen was performed with a curvilinear transducer with both volumetric sweeps and still imaging techniques  FINDINGS: PANCREAS:  Visualized portions of the pancreas are within normal limits  AORTA AND IVC:  Visualized portions are normal for patient age  LIVER: Size:  Mild enlargement  The liver measures 16 5 cm in the midclavicular line  Contour:  Surface contour is smooth  Parenchyma:  Mild increased echogenicity relative to right renal cortex  No liver mass identified  Limited imaging of the main portal vein shows it to be patent and hepatopetal  BILIARY: No gallbladder findings  No intrahepatic biliary dilatation  CBD measures 2 0 mm  No choledocholithiasis  Negative Groves's sign KIDNEY: Right kidney measures 10 6 x 6 0 x 4 7  cm  Volume 154 2 mL Kidney within normal limits  Left kidney measures 10 3 x 5 9 x 5 1 cm  Volume 160 3 mL Kidney within normal limits  SPLEEN: Measures 8 1 x 9 0 x 3 9 cm  Volume 148 8 mL Within normal limits  ASCITES:  None       Impression: Mild hepatomegaly Hepatic mild steatosis Workstation performed: [id number] [Negative] : Heme/Lymph

## 2022-12-20 LAB
A1AT SERPL-MCNC: 135 MG/DL (ref 83–199)
ALBUMIN SERPL-MCNC: 4.7 G/DL (ref 3.6–5.1)
ALBUMIN/GLOB SERPL: 1.6 (CALC) (ref 1–2.5)
ALP SERPL-CCNC: 50 U/L (ref 36–130)
ALT SERPL-CCNC: 14 U/L (ref 9–46)
AST SERPL-CCNC: 14 U/L (ref 10–40)
BASOPHILS # BLD AUTO: 20 CELLS/UL (ref 0–200)
BASOPHILS NFR BLD AUTO: 0.4 %
BILIRUB SERPL-MCNC: 1.1 MG/DL (ref 0.2–1.2)
BUN SERPL-MCNC: 14 MG/DL (ref 7–25)
BUN/CREAT SERPL: NORMAL (CALC) (ref 6–22)
CALCIUM SERPL-MCNC: 10 MG/DL (ref 8.6–10.3)
CHLORIDE SERPL-SCNC: 106 MMOL/L (ref 98–110)
CO2 SERPL-SCNC: 27 MMOL/L (ref 20–32)
CREAT SERPL-MCNC: 0.95 MG/DL (ref 0.6–1.26)
EOSINOPHIL # BLD AUTO: 50 CELLS/UL (ref 15–500)
EOSINOPHIL NFR BLD AUTO: 1 %
ERYTHROCYTE [DISTWIDTH] IN BLOOD BY AUTOMATED COUNT: 13.2 % (ref 11–15)
GFR/BSA.PRED SERPLBLD CYS-BASED-ARV: 110 ML/MIN/1.73M2
GLOBULIN SER CALC-MCNC: 2.9 G/DL (CALC) (ref 1.9–3.7)
GLUCOSE SERPL-MCNC: 76 MG/DL (ref 65–99)
HAV AB SER QL IA: NORMAL
HBV CORE AB SERPL QL IA: NORMAL
HBV SURFACE AB SER QL IA: NORMAL
HCT VFR BLD AUTO: 42 % (ref 38.5–50)
HGB BLD-MCNC: 13.8 G/DL (ref 13.2–17.1)
INR PPP: 1.1
LYMPHOCYTES # BLD AUTO: 1875 CELLS/UL (ref 850–3900)
LYMPHOCYTES NFR BLD AUTO: 37.5 %
MCH RBC QN AUTO: 30.7 PG (ref 27–33)
MCHC RBC AUTO-ENTMCNC: 32.9 G/DL (ref 32–36)
MCV RBC AUTO: 93.3 FL (ref 80–100)
MONOCYTES # BLD AUTO: 360 CELLS/UL (ref 200–950)
MONOCYTES NFR BLD AUTO: 7.2 %
NEUTROPHILS # BLD AUTO: 2695 CELLS/UL (ref 1500–7800)
NEUTROPHILS NFR BLD AUTO: 53.9 %
PLATELET # BLD AUTO: 258 THOUSAND/UL (ref 140–400)
PMV BLD REES-ECKER: 11.1 FL (ref 7.5–12.5)
POTASSIUM SERPL-SCNC: 3.7 MMOL/L (ref 3.5–5.3)
PROT SERPL-MCNC: 7.6 G/DL (ref 6.1–8.1)
PROTHROMBIN TIME: 10.9 SEC (ref 9–11.5)
RBC # BLD AUTO: 4.5 MILLION/UL (ref 4.2–5.8)
SODIUM SERPL-SCNC: 141 MMOL/L (ref 135–146)
WBC # BLD AUTO: 5 THOUSAND/UL (ref 3.8–10.8)

## 2022-12-27 LAB
A1AT PHENOTYP SERPL-IMP: NORMAL
A1AT SERPL-MCNC: 135 MG/DL (ref 83–199)
ALBUMIN SERPL-MCNC: 4.7 G/DL (ref 3.6–5.1)
ALBUMIN/GLOB SERPL: 1.6 (CALC) (ref 1–2.5)
ALP SERPL-CCNC: 50 U/L (ref 36–130)
ALT SERPL-CCNC: 14 U/L (ref 9–46)
AST SERPL-CCNC: 14 U/L (ref 10–40)
BASOPHILS # BLD AUTO: 20 CELLS/UL (ref 0–200)
BASOPHILS NFR BLD AUTO: 0.4 %
BILIRUB SERPL-MCNC: 1.1 MG/DL (ref 0.2–1.2)
BUN SERPL-MCNC: 14 MG/DL (ref 7–25)
BUN/CREAT SERPL: NORMAL (CALC) (ref 6–22)
CALCIUM SERPL-MCNC: 10 MG/DL (ref 8.6–10.3)
CHLORIDE SERPL-SCNC: 106 MMOL/L (ref 98–110)
CO2 SERPL-SCNC: 27 MMOL/L (ref 20–32)
CREAT SERPL-MCNC: 0.95 MG/DL (ref 0.6–1.26)
EOSINOPHIL # BLD AUTO: 50 CELLS/UL (ref 15–500)
EOSINOPHIL NFR BLD AUTO: 1 %
ERYTHROCYTE [DISTWIDTH] IN BLOOD BY AUTOMATED COUNT: 13.2 % (ref 11–15)
GFR/BSA.PRED SERPLBLD CYS-BASED-ARV: 110 ML/MIN/1.73M2
GLOBULIN SER CALC-MCNC: 2.9 G/DL (CALC) (ref 1.9–3.7)
GLUCOSE SERPL-MCNC: 76 MG/DL (ref 65–99)
HAV AB SER QL IA: NORMAL
HBV CORE AB SERPL QL IA: NORMAL
HBV SURFACE AB SER QL IA: NORMAL
HCT VFR BLD AUTO: 42 % (ref 38.5–50)
HGB BLD-MCNC: 13.8 G/DL (ref 13.2–17.1)
INR PPP: 1.1
LYMPHOCYTES # BLD AUTO: 1875 CELLS/UL (ref 850–3900)
LYMPHOCYTES NFR BLD AUTO: 37.5 %
MCH RBC QN AUTO: 30.7 PG (ref 27–33)
MCHC RBC AUTO-ENTMCNC: 32.9 G/DL (ref 32–36)
MCV RBC AUTO: 93.3 FL (ref 80–100)
MONOCYTES # BLD AUTO: 360 CELLS/UL (ref 200–950)
MONOCYTES NFR BLD AUTO: 7.2 %
NEUTROPHILS # BLD AUTO: 2695 CELLS/UL (ref 1500–7800)
NEUTROPHILS NFR BLD AUTO: 53.9 %
PLATELET # BLD AUTO: 258 THOUSAND/UL (ref 140–400)
PMV BLD REES-ECKER: 11.1 FL (ref 7.5–12.5)
POTASSIUM SERPL-SCNC: 3.7 MMOL/L (ref 3.5–5.3)
PROT SERPL-MCNC: 7.6 G/DL (ref 6.1–8.1)
PROTHROMBIN TIME: 10.9 SEC (ref 9–11.5)
RBC # BLD AUTO: 4.5 MILLION/UL (ref 4.2–5.8)
SODIUM SERPL-SCNC: 141 MMOL/L (ref 135–146)
WBC # BLD AUTO: 5 THOUSAND/UL (ref 3.8–10.8)

## 2023-01-09 ENCOUNTER — CLINICAL SUPPORT (OUTPATIENT)
Dept: FAMILY MEDICINE CLINIC | Facility: CLINIC | Age: 33
End: 2023-01-09

## 2023-01-09 ENCOUNTER — HOSPITAL ENCOUNTER (OUTPATIENT)
Dept: ULTRASOUND IMAGING | Facility: HOSPITAL | Age: 33
Discharge: HOME | End: 2023-01-09

## 2023-01-09 DIAGNOSIS — Z23 ENCOUNTER FOR IMMUNIZATION: Primary | ICD-10-CM

## 2023-01-09 DIAGNOSIS — K76.0 FATTY LIVER: ICD-10-CM

## 2023-01-13 ENCOUNTER — PATIENT MESSAGE (OUTPATIENT)
Dept: GASTROENTEROLOGY | Facility: CLINIC | Age: 33
End: 2023-01-13

## 2023-02-20 ENCOUNTER — CLINICAL SUPPORT (OUTPATIENT)
Dept: FAMILY MEDICINE CLINIC | Facility: CLINIC | Age: 33
End: 2023-02-20

## 2023-02-20 DIAGNOSIS — Z23 ENCOUNTER FOR IMMUNIZATION: Primary | ICD-10-CM

## 2023-06-19 ENCOUNTER — CLINICAL SUPPORT (OUTPATIENT)
Dept: FAMILY MEDICINE CLINIC | Facility: CLINIC | Age: 33
End: 2023-06-19
Payer: COMMERCIAL

## 2023-06-19 DIAGNOSIS — Z23 ENCOUNTER FOR IMMUNIZATION: Primary | ICD-10-CM

## 2023-06-19 PROCEDURE — 90746 HEPB VACCINE 3 DOSE ADULT IM: CPT

## 2023-06-19 PROCEDURE — 90471 IMMUNIZATION ADMIN: CPT

## 2023-07-17 ENCOUNTER — CLINICAL SUPPORT (OUTPATIENT)
Dept: FAMILY MEDICINE CLINIC | Facility: CLINIC | Age: 33
End: 2023-07-17
Payer: COMMERCIAL

## 2023-07-17 DIAGNOSIS — Z23 ENCOUNTER FOR IMMUNIZATION: Primary | ICD-10-CM

## 2023-07-17 PROCEDURE — 90471 IMMUNIZATION ADMIN: CPT

## 2023-07-17 PROCEDURE — 90632 HEPA VACCINE ADULT IM: CPT

## 2023-08-13 ENCOUNTER — HOSPITAL ENCOUNTER (EMERGENCY)
Facility: HOSPITAL | Age: 33
Discharge: HOME | End: 2023-08-13
Attending: EMERGENCY MEDICINE
Payer: COMMERCIAL

## 2023-08-13 ENCOUNTER — APPOINTMENT (EMERGENCY)
Dept: CT IMAGING | Facility: HOSPITAL | Age: 33
End: 2023-08-13
Payer: COMMERCIAL

## 2023-08-13 VITALS
HEART RATE: 77 BPM | RESPIRATION RATE: 16 BRPM | TEMPERATURE: 98 F | WEIGHT: 161.38 LBS | SYSTOLIC BLOOD PRESSURE: 114 MMHG | DIASTOLIC BLOOD PRESSURE: 57 MMHG | OXYGEN SATURATION: 100 % | BODY MASS INDEX: 23.83 KG/M2

## 2023-08-13 DIAGNOSIS — N20.1 URETERAL STONE: Primary | ICD-10-CM

## 2023-08-13 LAB
ALBUMIN SERPL BCP-MCNC: 4.5 G/DL (ref 3.5–5)
ALP SERPL-CCNC: 42 U/L (ref 34–104)
ALT SERPL W P-5'-P-CCNC: 10 U/L (ref 7–52)
ANION GAP SERPL CALCULATED.3IONS-SCNC: 11 MMOL/L
AST SERPL W P-5'-P-CCNC: 12 U/L (ref 13–39)
BACTERIA UR QL AUTO: ABNORMAL /HPF
BASOPHILS # BLD AUTO: 0.02 THOUSANDS/ÂΜL (ref 0–0.1)
BASOPHILS NFR BLD AUTO: 0 % (ref 0–1)
BILIRUB SERPL-MCNC: 1.56 MG/DL (ref 0.2–1)
BILIRUB UR QL STRIP: NEGATIVE
BUN SERPL-MCNC: 10 MG/DL (ref 5–25)
CALCIUM SERPL-MCNC: 9.5 MG/DL (ref 8.4–10.2)
CHLORIDE SERPL-SCNC: 106 MMOL/L (ref 96–108)
CLARITY UR: CLEAR
CO2 SERPL-SCNC: 23 MMOL/L (ref 21–32)
COLOR UR: COLORLESS
CREAT SERPL-MCNC: 1.1 MG/DL (ref 0.6–1.3)
EOSINOPHIL # BLD AUTO: 0.03 THOUSAND/ÂΜL (ref 0–0.61)
EOSINOPHIL NFR BLD AUTO: 1 % (ref 0–6)
ERYTHROCYTE [DISTWIDTH] IN BLOOD BY AUTOMATED COUNT: 12.9 % (ref 11.6–15.1)
GFR SERPL CREATININE-BSD FRML MDRD: 88 ML/MIN/1.73SQ M
GLUCOSE SERPL-MCNC: 139 MG/DL (ref 65–140)
GLUCOSE UR STRIP-MCNC: NEGATIVE MG/DL
HCT VFR BLD AUTO: 41.3 % (ref 36.5–49.3)
HGB BLD-MCNC: 13.2 G/DL (ref 12–17)
HGB UR QL STRIP.AUTO: ABNORMAL
IMM GRANULOCYTES # BLD AUTO: 0.01 THOUSAND/UL (ref 0–0.2)
IMM GRANULOCYTES NFR BLD AUTO: 0 % (ref 0–2)
KETONES UR STRIP-MCNC: ABNORMAL MG/DL
LEUKOCYTE ESTERASE UR QL STRIP: NEGATIVE
LIPASE SERPL-CCNC: 15 U/L (ref 11–82)
LYMPHOCYTES # BLD AUTO: 1.75 THOUSANDS/ÂΜL (ref 0.6–4.47)
LYMPHOCYTES NFR BLD AUTO: 29 % (ref 14–44)
MCH RBC QN AUTO: 30.4 PG (ref 26.8–34.3)
MCHC RBC AUTO-ENTMCNC: 32 G/DL (ref 31.4–37.4)
MCV RBC AUTO: 95 FL (ref 82–98)
MONOCYTES # BLD AUTO: 0.32 THOUSAND/ÂΜL (ref 0.17–1.22)
MONOCYTES NFR BLD AUTO: 5 % (ref 4–12)
MUCOUS THREADS UR QL AUTO: ABNORMAL
NEUTROPHILS # BLD AUTO: 3.96 THOUSANDS/ÂΜL (ref 1.85–7.62)
NEUTS SEG NFR BLD AUTO: 65 % (ref 43–75)
NITRITE UR QL STRIP: NEGATIVE
NON-SQ EPI CELLS URNS QL MICRO: ABNORMAL /HPF
NRBC BLD AUTO-RTO: 0 /100 WBCS
PH UR STRIP.AUTO: 6.5 [PH]
PLATELET # BLD AUTO: 255 THOUSANDS/UL (ref 149–390)
PMV BLD AUTO: 10.2 FL (ref 8.9–12.7)
POTASSIUM SERPL-SCNC: 3.1 MMOL/L (ref 3.5–5.3)
PROT SERPL-MCNC: 7.4 G/DL (ref 6.4–8.4)
PROT UR STRIP-MCNC: ABNORMAL MG/DL
RBC # BLD AUTO: 4.34 MILLION/UL (ref 3.88–5.62)
RBC #/AREA URNS AUTO: ABNORMAL /HPF
SODIUM SERPL-SCNC: 140 MMOL/L (ref 135–147)
SP GR UR STRIP.AUTO: >=1.05 (ref 1–1.03)
UROBILINOGEN UR STRIP-ACNC: <2 MG/DL
WBC # BLD AUTO: 6.09 THOUSAND/UL (ref 4.31–10.16)
WBC #/AREA URNS AUTO: ABNORMAL /HPF

## 2023-08-13 PROCEDURE — 85025 COMPLETE CBC W/AUTO DIFF WBC: CPT

## 2023-08-13 PROCEDURE — 96361 HYDRATE IV INFUSION ADD-ON: CPT

## 2023-08-13 PROCEDURE — 96374 THER/PROPH/DIAG INJ IV PUSH: CPT

## 2023-08-13 PROCEDURE — 83690 ASSAY OF LIPASE: CPT

## 2023-08-13 PROCEDURE — 96376 TX/PRO/DX INJ SAME DRUG ADON: CPT

## 2023-08-13 PROCEDURE — 81001 URINALYSIS AUTO W/SCOPE: CPT

## 2023-08-13 PROCEDURE — 74177 CT ABD & PELVIS W/CONTRAST: CPT

## 2023-08-13 PROCEDURE — 99284 EMERGENCY DEPT VISIT MOD MDM: CPT

## 2023-08-13 PROCEDURE — 36415 COLL VENOUS BLD VENIPUNCTURE: CPT

## 2023-08-13 PROCEDURE — 99285 EMERGENCY DEPT VISIT HI MDM: CPT

## 2023-08-13 PROCEDURE — 80053 COMPREHEN METABOLIC PANEL: CPT

## 2023-08-13 PROCEDURE — 96375 TX/PRO/DX INJ NEW DRUG ADDON: CPT

## 2023-08-13 PROCEDURE — G1004 CDSM NDSC: HCPCS

## 2023-08-13 RX ORDER — OXYCODONE HYDROCHLORIDE 5 MG/1
5 TABLET ORAL EVERY 6 HOURS PRN
Qty: 15 TABLET | Refills: 0 | Status: SHIPPED | OUTPATIENT
Start: 2023-08-13

## 2023-08-13 RX ORDER — TAMSULOSIN HYDROCHLORIDE 0.4 MG/1
0.4 CAPSULE ORAL
Qty: 10 CAPSULE | Refills: 0 | Status: SHIPPED | OUTPATIENT
Start: 2023-08-13 | End: 2023-08-23

## 2023-08-13 RX ORDER — POTASSIUM CHLORIDE 20 MEQ/1
40 TABLET, EXTENDED RELEASE ORAL ONCE
Status: COMPLETED | OUTPATIENT
Start: 2023-08-13 | End: 2023-08-13

## 2023-08-13 RX ORDER — ONDANSETRON 4 MG/1
4 TABLET, ORALLY DISINTEGRATING ORAL EVERY 6 HOURS PRN
Qty: 20 TABLET | Refills: 0 | Status: SHIPPED | OUTPATIENT
Start: 2023-08-13

## 2023-08-13 RX ORDER — KETOROLAC TROMETHAMINE 30 MG/ML
15 INJECTION, SOLUTION INTRAMUSCULAR; INTRAVENOUS ONCE
Status: COMPLETED | OUTPATIENT
Start: 2023-08-13 | End: 2023-08-13

## 2023-08-13 RX ORDER — ONDANSETRON 2 MG/ML
4 INJECTION INTRAMUSCULAR; INTRAVENOUS ONCE
Status: COMPLETED | OUTPATIENT
Start: 2023-08-13 | End: 2023-08-13

## 2023-08-13 RX ORDER — IBUPROFEN 600 MG/1
600 TABLET ORAL EVERY 6 HOURS PRN
Qty: 30 TABLET | Refills: 0 | Status: SHIPPED | OUTPATIENT
Start: 2023-08-13

## 2023-08-13 RX ORDER — MORPHINE SULFATE 4 MG/ML
4 INJECTION, SOLUTION INTRAMUSCULAR; INTRAVENOUS ONCE
Status: COMPLETED | OUTPATIENT
Start: 2023-08-13 | End: 2023-08-13

## 2023-08-13 RX ADMIN — IOHEXOL 100 ML: 350 INJECTION, SOLUTION INTRAVENOUS at 06:33

## 2023-08-13 RX ADMIN — ONDANSETRON 4 MG: 2 INJECTION INTRAMUSCULAR; INTRAVENOUS at 05:10

## 2023-08-13 RX ADMIN — KETOROLAC TROMETHAMINE 15 MG: 30 INJECTION, SOLUTION INTRAMUSCULAR; INTRAVENOUS at 05:10

## 2023-08-13 RX ADMIN — MORPHINE SULFATE 4 MG: 4 INJECTION INTRAVENOUS at 06:00

## 2023-08-13 RX ADMIN — SODIUM CHLORIDE 1000 ML: 0.9 INJECTION, SOLUTION INTRAVENOUS at 05:13

## 2023-08-13 RX ADMIN — POTASSIUM CHLORIDE 40 MEQ: 1500 TABLET, EXTENDED RELEASE ORAL at 06:03

## 2023-08-13 RX ADMIN — KETOROLAC TROMETHAMINE 15 MG: 30 INJECTION, SOLUTION INTRAMUSCULAR; INTRAVENOUS at 06:55

## 2023-08-13 NOTE — Clinical Note
Antonio Leblanc was seen and treated in our emergency department on 8/13/2023. Diagnosis:     Gallo Pinzon  . He may return on this date: May return to work when cleared by family doctor/specialist     If you have any questions or concerns, please don't hesitate to call.       Sean Peralta PA-C    ______________________________           _______________          _______________  Hospital Representative                              Date                                Time

## 2023-08-13 NOTE — ED PROVIDER NOTES
History  Chief Complaint   Patient presents with   • Flank Pain     Patient reports right sided flank pain since yesterday that radiates down to right testicle. +nausea/vomiting. Denies urinary symptoms. The patient is a 70-year-old male with no significant past medical history who presents to the ED for evaluation of right-sided flank pain and abdominal pain with associated nausea and nonbloody vomiting. He reports it began yesterday, resolved, and then returned today. He has not taken anything for symptoms. He does report the pain radiates into his right testicle, however the pain is primarily in his flank. He otherwise denies fever, chills, dysuria, hematuria, diarrhea, melena, hematochezia, constipation, chest pain, dyspnea. None       Past Medical History:   Diagnosis Date   • Anxiety    • Depression    • No known problems        Past Surgical History:   Procedure Laterality Date   • SKIN GRAFT         Family History   Problem Relation Age of Onset   • Hypertension Father    • Diabetes Maternal Grandmother      I have reviewed and agree with the history as documented. E-Cigarette/Vaping   • E-Cigarette Use Never User      E-Cigarette/Vaping Substances     Social History     Tobacco Use   • Smoking status: Former   • Smokeless tobacco: Never   Vaping Use   • Vaping Use: Never used   Substance Use Topics   • Alcohol use: Not Currently     Comment: occasionally   • Drug use: Yes     Types: Marijuana     Comment: States has cut back on use       Review of Systems   Constitutional: Negative for chills and fever. HENT: Negative for congestion and rhinorrhea. Respiratory: Negative for cough and shortness of breath. Cardiovascular: Negative for chest pain and leg swelling. Gastrointestinal: Positive for abdominal pain, nausea and vomiting. Negative for constipation and diarrhea. Genitourinary: Positive for flank pain and testicular pain.  Negative for dysuria, penile pain, penile swelling and scrotal swelling. Musculoskeletal: Negative for arthralgias and myalgias. Skin: Negative for rash and wound. Neurological: Negative for dizziness, weakness, numbness and headaches. Psychiatric/Behavioral: Negative for behavioral problems. Physical Exam  Physical Exam  Vitals and nursing note reviewed. Constitutional:       General: He is not in acute distress. Appearance: He is well-developed. He is not ill-appearing or toxic-appearing. Comments: Uncomfortable appearing    HENT:      Head: Normocephalic and atraumatic. Eyes:      Conjunctiva/sclera: Conjunctivae normal.   Cardiovascular:      Rate and Rhythm: Normal rate and regular rhythm. Heart sounds: No murmur heard. Pulmonary:      Effort: Pulmonary effort is normal. No respiratory distress. Breath sounds: Normal breath sounds. Abdominal:      Palpations: Abdomen is soft. Tenderness: There is abdominal tenderness in the right upper quadrant and right lower quadrant. There is right CVA tenderness and guarding. There is no left CVA tenderness or rebound. Negative signs include Groves's sign and McBurney's sign. Genitourinary:     Penis: Normal.       Testes:         Right: Tenderness present. Swelling not present. Left: Tenderness or swelling not present. Musculoskeletal:         General: No swelling. Cervical back: Neck supple. Skin:     General: Skin is warm and dry. Capillary Refill: Capillary refill takes less than 2 seconds. Neurological:      Mental Status: He is alert.    Psychiatric:         Mood and Affect: Mood normal.         Vital Signs  ED Triage Vitals [08/13/23 0449]   Temperature Pulse Respirations Blood Pressure SpO2   98 °F (36.7 °C) 70 18 131/73 100 %      Temp Source Heart Rate Source Patient Position - Orthostatic VS BP Location FiO2 (%)   Oral Monitor Sitting Right arm --      Pain Score       9           Vitals:    08/13/23 0449   BP: 131/73   Pulse: 70   Patient Position - Orthostatic VS: Sitting         Visual Acuity      ED Medications  Medications   ondansetron (ZOFRAN) injection 4 mg (4 mg Intravenous Given 8/13/23 0510)   ketorolac (TORADOL) injection 15 mg (15 mg Intravenous Given 8/13/23 0510)   sodium chloride 0.9 % bolus 1,000 mL (1,000 mL Intravenous New Bag 8/13/23 0513)   morphine injection 4 mg (4 mg Intravenous Given 8/13/23 0600)   potassium chloride (K-DUR,KLOR-CON) CR tablet 40 mEq (40 mEq Oral Given 8/13/23 0603)       Diagnostic Studies  Results Reviewed     Procedure Component Value Units Date/Time    Comprehensive metabolic panel [[id number]]  (Abnormal) Collected: 08/13/23 0508    Lab Status: Final result Specimen: Blood from Arm, Right Updated: 08/13/23 0536     Sodium 140 mmol/L      Potassium 3.1 mmol/L      Chloride 106 mmol/L      CO2 23 mmol/L      ANION GAP 11 mmol/L      BUN 10 mg/dL      Creatinine 1.10 mg/dL      Glucose 139 mg/dL      Calcium 9.5 mg/dL      AST 12 U/L      ALT 10 U/L      Alkaline Phosphatase 42 U/L      Total Protein 7.4 g/dL      Albumin 4.5 g/dL      Total Bilirubin 1.56 mg/dL      eGFR 88 ml/min/1.73sq m     Narrative:      [place] guidelines for Chronic Kidney Disease (CKD):   •  Stage 1 with normal or high GFR (GFR > 90 mL/min/1.73 square meters)  •  Stage 2 Mild CKD (GFR = 60-89 mL/min/1.73 square meters)  •  Stage 3A Moderate CKD (GFR = 45-59 mL/min/1.73 square meters)  •  Stage 3B Moderate CKD (GFR = 30-44 mL/min/1.73 square meters)  •  Stage 4 Severe CKD (GFR = 15-29 mL/min/1.73 square meters)  •  Stage 5 End Stage CKD (GFR <15 mL/min/1.73 square meters)  Note: GFR calculation is accurate only with a steady state creatinine    Lipase [[id number]]  (Normal) Collected: 08/13/23 0508    Lab Status: Final result Specimen: Blood from Arm, Right Updated: 08/13/23 0536     Lipase 15 u/L     CBC and differential [[id number]] Collected: 08/13/23 0531    Lab Status: Final result Specimen: Blood from Arm, Right Updated: 08/13/23 0518     WBC 6.09 Thousand/uL      RBC 4.34 Million/uL      Hemoglobin 13.2 g/dL      Hematocrit 41.3 %      MCV 95 fL      MCH 30.4 pg      MCHC 32.0 g/dL      RDW 12.9 %      MPV 10.2 fL      Platelets 387 Thousands/uL      nRBC 0 /100 WBCs      Neutrophils Relative 65 %      Immat GRANS % 0 %      Lymphocytes Relative 29 %      Monocytes Relative 5 %      Eosinophils Relative 1 %      Basophils Relative 0 %      Neutrophils Absolute 3.96 Thousands/µL      Immature Grans Absolute 0.01 Thousand/uL      Lymphocytes Absolute 1.75 Thousands/µL      Monocytes Absolute 0.32 Thousand/µL      Eosinophils Absolute 0.03 Thousand/µL      Basophils Absolute 0.02 Thousands/µL     UA w Reflex to Microscopic w Reflex to Culture [[id number]]     Lab Status: No result Specimen: Urine                  CT abdomen pelvis with contrast    (Results Pending)              Procedures  Procedures         ED Course  ED Course as of 08/13/23 0618   Sun Aug 13, 2023   0530 WBC: 6.09   0530 Hemoglobin: 13.2   0558 Patient reporting ongoing pain. Hypokalemia present; will treat patient's pain with morphine, will replete potassium       Medical Decision Making  DDx including but not limited to: renal colic, pyelonephritis, UTI, GI etiology, appendicitis, diverticulitis, pancreatitis, cholecystitis, biliary colic    Will obtain UA to evaluate for UTI. Will obtain CBC to evaluate for leukocytosis, anemia. Will obtain CMP to evaluate kidney function, for electrolyte disturbance. Will obtain CT abdomen and pelvis. Patient with hypokalemia, repleted in the ED. Mild elevation in total bilirubin. Care turned over to [facility], PA-C, pending CT abdomen and pelvis. Patient's is stable at this time    Flank pain: acute illness or injury  Amount and/or Complexity of Data Reviewed  External Data Reviewed: notes. Labs: ordered. Decision-making details documented in ED Course. Radiology: ordered.  Decision-making details documented in ED Course. Risk  Prescription drug management. Disposition  Final diagnoses:   Flank pain     Time reflects when diagnosis was documented in both MDM as applicable and the Disposition within this note     Time User Action Codes Description Comment    8/13/2023  6:18 AM Joe Reese [R10.9] Flank pain       ED Disposition     None      Follow-up Information    None         Patient's Medications    No medications on file       No discharge procedures on file.     PDMP Review     None          ED Provider  Electronically Signed by           Monica Martinez PA-C  08/13/23 4623

## 2023-08-13 NOTE — DISCHARGE INSTRUCTIONS
Please refer to the attached information for strict return instructions. If symptoms worsen or new symptoms develop please return to the ER. Please follow up with urology for re-evaluation as soon as possible. Drink plenty of fluids to stay hydrated.

## 2023-08-13 NOTE — Clinical Note
Xavi Bautista was seen and treated in our emergency department on 8/13/2023. Diagnosis:     Radha Granado  . He may return on this date: 08/16/2023         If you have any questions or concerns, please don't hesitate to call.       Lev Gray PA-C    ______________________________           _______________          _______________  Hospital Representative                              Date                                Time

## 2023-08-13 NOTE — ED CARE HANDOFF
Emergency Department Sign Out Note        Sign out and transfer of care from [facility]. See Separate Emergency Department note. The patient, Nicolas Jane, was evaluated by the previous provider for flank/abdominal pain, suspected ureteral stone. Workup Completed:  Labs Reviewed   COMPREHENSIVE METABOLIC PANEL - Abnormal       Result Value Ref Range Status    Sodium 140  135 - 147 mmol/L Final    Potassium 3.1 (*) 3.5 - 5.3 mmol/L Final    Chloride 106  96 - 108 mmol/L Final    CO2 23  21 - 32 mmol/L Final    ANION GAP 11  mmol/L Final    BUN 10  5 - 25 mg/dL Final    Creatinine 1.10  0.60 - 1.30 mg/dL Final    Comment: Standardized to IDMS reference method    Glucose 139  65 - 140 mg/dL Final    Comment: If the patient is fasting, the ADA then defines impaired fasting glucose as > 100 mg/dL and diabetes as > or equal to 123 mg/dL. Calcium 9.5  8.4 - 10.2 mg/dL Final    AST 12 (*) 13 - 39 U/L Final    ALT 10  7 - 52 U/L Final    Comment: Specimen collection should occur prior to Sulfasalazine administration due to the potential for falsely depressed results. Alkaline Phosphatase 42  34 - 104 U/L Final    Total Protein 7.4  6.4 - 8.4 g/dL Final    Albumin 4.5  3.5 - 5.0 g/dL Final    Total Bilirubin 1.56 (*) 0.20 - 1.00 mg/dL Final    Comment: Use of this assay is not recommended for patients undergoing treatment with eltrombopag due to the potential for falsely elevated results. N-acetyl-p-benzoquinone imine (metabolite of Acetaminophen) will generate erroneously low results in samples for patients that have taken an overdose of Acetaminophen.     eGFR 88  ml/min/1.73sq m Final    Narrative:     [place] guidelines for Chronic Kidney Disease (CKD):   •  Stage 1 with normal or high GFR (GFR > 90 mL/min/1.73 square meters)  •  Stage 2 Mild CKD (GFR = 60-89 mL/min/1.73 square meters)  •  Stage 3A Moderate CKD (GFR = 45-59 mL/min/1.73 square meters)  •  Stage 3B Moderate CKD (GFR = 30-44 mL/min/1.73 square meters)  •  Stage 4 Severe CKD (GFR = 15-29 mL/min/1.73 square meters)  •  Stage 5 End Stage CKD (GFR <15 mL/min/1.73 square meters)  Note: GFR calculation is accurate only with a steady state creatinine   UA W REFLEX TO MICROSCOPIC WITH REFLEX TO CULTURE - Abnormal    Color, UA Colorless   Final    Clarity, UA Clear   Final    Specific Gravity, UA >=1.050 (*) 1.003 - 1.030 Final    pH, UA 6.5  4.5, 5.0, 5.5, 6.0, 6.5, 7.0, 7.5, 8.0 Final    Leukocytes, UA Negative  Negative Final    Nitrite, UA Negative  Negative Final    Protein, UA 30 (1+) (*) Negative mg/dl Final    Glucose, UA Negative  Negative mg/dl Final    Ketones, UA 10 (1+) (*) Negative mg/dl Final    Urobilinogen, UA <2.0  <2.0 mg/dl mg/dl Final    Bilirubin, UA Negative  Negative Final    Occult Blood, UA Large (*) Negative Final   LIPASE - Normal    Lipase 15  11 - 82 u/L Final   CBC AND DIFFERENTIAL    WBC 6.09  4.31 - 10.16 Thousand/uL Final    RBC 4.34  3.88 - 5.62 Million/uL Final    Hemoglobin 13.2  12.0 - 17.0 g/dL Final    Hematocrit 41.3  36.5 - 49.3 % Final    MCV 95  82 - 98 fL Final    MCH 30.4  26.8 - 34.3 pg Final    MCHC 32.0  31.4 - 37.4 g/dL Final    RDW 12.9  11.6 - 15.1 % Final    MPV 10.2  8.9 - 12.7 fL Final    Platelets 792  647 - 390 Thousands/uL Final    nRBC 0  /100 WBCs Final    Neutrophils Relative 65  43 - 75 % Final    Immat GRANS % 0  0 - 2 % Final    Lymphocytes Relative 29  14 - 44 % Final    Monocytes Relative 5  4 - 12 % Final    Eosinophils Relative 1  0 - 6 % Final    Basophils Relative 0  0 - 1 % Final    Neutrophils Absolute 3.96  1.85 - 7.62 Thousands/µL Final    Immature Grans Absolute 0.01  0.00 - 0.20 Thousand/uL Final    Lymphocytes Absolute 1.75  0.60 - 4.47 Thousands/µL Final    Monocytes Absolute 0.32  0.17 - 1.22 Thousand/µL Final    Eosinophils Absolute 0.03  0.00 - 0.61 Thousand/µL Final    Basophils Absolute 0.02  0.00 - 0.10 Thousands/µL Final     CT abdomen pelvis with contrast   Final Result      Mid right ureteral calculus, 3 mm resulting in delayed right renal nephrogram and right-sided hydroureteronephrosis. Workstation performed: [id number]               ED Course / Workup Pending (followup):  -Awaiting CT abd/pelvis at time of my sign on                                    ED Course as of 08/13/23 1002   Sun Aug 13, 2023   0640 Pt reports increased pain again - did just receive morphine about 40 minutes ago. Will give additional dose of toradol, await CT results. Procedures  Medical Decision Making  3 mm R sided ureteral stone noted on CT with associated hydronephrosis. Hematuria noted w/o evidence of UTI. Normal renal fxn. Pain well controlled with ED regimen at time of discharge. Plan for d/c with expulsive therapy, pain control. Plenty of fluids encouraged. Follow up with urology recommended, referral provided. Return to ED indications reviewed. Amount and/or Complexity of Data Reviewed  Labs: ordered. Radiology: ordered. Risk  Prescription drug management. Disposition  Final diagnoses:   Ureteral stone     Time reflects when diagnosis was documented in both MDM as applicable and the Disposition within this note     Time User Action Codes Description Comment    8/13/2023  6:18 AM Angélica Filiberto Add [R10.9] Flank pain     8/13/2023  9:12 AM Elva Learn Remove [R10.9] Flank pain     8/13/2023  9:12 AM Elva Learn Add [N20.1] Ureteral stone       ED Disposition     ED Disposition   Discharge    Condition   Stable    Date/Time   Sun Aug 13, 2023  9:04 AM    Comment   Bharathi Enrique discharge to home/self care.                Follow-up Information     Follow up With Specialties Details Why Contact Info Additional Donovan Salazar Emergency Department Emergency Medicine  If symptoms worsen [address] [phone] 6054 [facility] Emergency Department, [address] 1 [facility], [address] [place] [address] [address] [phone] [facility] Urology [facility] Urology Schedule an appointment as soon as possible for a visit   [address] [place] [phone] 6229 [facility] Urology [facility], [address] [place] [address], [place], [place], [address] [phone]        Discharge Medication List as of 8/13/2023  9:14 AM      START taking these medications    Details   ibuprofen (MOTRIN) 600 mg tablet Take 1 tablet (600 mg total) by mouth every 6 (six) hours as needed for mild pain or moderate pain, Starting Sun 8/13/2023, Normal      ondansetron (ZOFRAN-ODT) 4 mg disintegrating tablet Take 1 tablet (4 mg total) by mouth every 6 (six) hours as needed for nausea or vomiting, Starting Sun 8/13/2023, Normal      oxyCODONE (Roxicodone) 5 immediate release tablet Take 1 tablet (5 mg total) by mouth every 6 (six) hours as needed for severe pain for up to 15 doses Max Daily Amount: 20 mg, Starting Sun 8/13/2023, Normal      tamsulosin (FLOMAX) 0.4 mg Take 1 capsule (0.4 mg total) by mouth daily with dinner for 10 days, Starting Sun 8/13/2023, Until Wed 8/23/2023, Normal           No discharge procedures on file.        ED Provider  Electronically Signed by     Lev Gray PA-C  08/13/23 1002

## 2023-08-13 NOTE — ED NOTES
PT brought back from CT with 10/10 pain. CT tech notified provider on the way.      Zainab Carvajal RN  08/13/23 5765

## 2023-08-14 ENCOUNTER — HOSPITAL ENCOUNTER (EMERGENCY)
Facility: HOSPITAL | Age: 33
Discharge: HOME | End: 2023-08-14
Attending: EMERGENCY MEDICINE | Admitting: EMERGENCY MEDICINE
Payer: COMMERCIAL

## 2023-08-14 ENCOUNTER — TELEPHONE (OUTPATIENT)
Dept: UROLOGY | Facility: AMBULATORY SURGERY CENTER | Age: 33
End: 2023-08-14

## 2023-08-14 VITALS
BODY MASS INDEX: 23.78 KG/M2 | TEMPERATURE: 97.8 F | OXYGEN SATURATION: 100 % | DIASTOLIC BLOOD PRESSURE: 58 MMHG | WEIGHT: 161 LBS | SYSTOLIC BLOOD PRESSURE: 127 MMHG | RESPIRATION RATE: 16 BRPM | HEART RATE: 64 BPM

## 2023-08-14 DIAGNOSIS — N20.1 URETEROLITHIASIS: Primary | ICD-10-CM

## 2023-08-14 LAB
ANION GAP SERPL CALCULATED.3IONS-SCNC: 7 MMOL/L
BASOPHILS # BLD AUTO: 0.02 THOUSANDS/ÂΜL (ref 0–0.1)
BASOPHILS NFR BLD AUTO: 0 % (ref 0–1)
BUN SERPL-MCNC: 15 MG/DL (ref 5–25)
CALCIUM SERPL-MCNC: 9 MG/DL (ref 8.4–10.2)
CHLORIDE SERPL-SCNC: 109 MMOL/L (ref 96–108)
CO2 SERPL-SCNC: 24 MMOL/L (ref 21–32)
CREAT SERPL-MCNC: 1.16 MG/DL (ref 0.6–1.3)
EOSINOPHIL # BLD AUTO: 0.04 THOUSAND/ÂΜL (ref 0–0.61)
EOSINOPHIL NFR BLD AUTO: 1 % (ref 0–6)
ERYTHROCYTE [DISTWIDTH] IN BLOOD BY AUTOMATED COUNT: 13 % (ref 11.6–15.1)
GFR SERPL CREATININE-BSD FRML MDRD: 83 ML/MIN/1.73SQ M
GLUCOSE SERPL-MCNC: 90 MG/DL (ref 65–140)
HCT VFR BLD AUTO: 36.9 % (ref 36.5–49.3)
HGB BLD-MCNC: 11.7 G/DL (ref 12–17)
IMM GRANULOCYTES # BLD AUTO: 0.01 THOUSAND/UL (ref 0–0.2)
IMM GRANULOCYTES NFR BLD AUTO: 0 % (ref 0–2)
LYMPHOCYTES # BLD AUTO: 2.34 THOUSANDS/ÂΜL (ref 0.6–4.47)
LYMPHOCYTES NFR BLD AUTO: 42 % (ref 14–44)
MCH RBC QN AUTO: 30.4 PG (ref 26.8–34.3)
MCHC RBC AUTO-ENTMCNC: 31.7 G/DL (ref 31.4–37.4)
MCV RBC AUTO: 96 FL (ref 82–98)
MONOCYTES # BLD AUTO: 0.4 THOUSAND/ÂΜL (ref 0.17–1.22)
MONOCYTES NFR BLD AUTO: 7 % (ref 4–12)
NEUTROPHILS # BLD AUTO: 2.78 THOUSANDS/ÂΜL (ref 1.85–7.62)
NEUTS SEG NFR BLD AUTO: 50 % (ref 43–75)
NRBC BLD AUTO-RTO: 0 /100 WBCS
PLATELET # BLD AUTO: 212 THOUSANDS/UL (ref 149–390)
PMV BLD AUTO: 9.9 FL (ref 8.9–12.7)
POTASSIUM SERPL-SCNC: 3.2 MMOL/L (ref 3.5–5.3)
RBC # BLD AUTO: 3.85 MILLION/UL (ref 3.88–5.62)
SODIUM SERPL-SCNC: 140 MMOL/L (ref 135–147)
WBC # BLD AUTO: 5.59 THOUSAND/UL (ref 4.31–10.16)

## 2023-08-14 PROCEDURE — 80048 BASIC METABOLIC PNL TOTAL CA: CPT

## 2023-08-14 PROCEDURE — 96374 THER/PROPH/DIAG INJ IV PUSH: CPT

## 2023-08-14 PROCEDURE — 99284 EMERGENCY DEPT VISIT MOD MDM: CPT

## 2023-08-14 PROCEDURE — 96361 HYDRATE IV INFUSION ADD-ON: CPT

## 2023-08-14 PROCEDURE — 99285 EMERGENCY DEPT VISIT HI MDM: CPT

## 2023-08-14 PROCEDURE — 85025 COMPLETE CBC W/AUTO DIFF WBC: CPT

## 2023-08-14 PROCEDURE — 36415 COLL VENOUS BLD VENIPUNCTURE: CPT

## 2023-08-14 RX ORDER — POTASSIUM CHLORIDE 20 MEQ/1
40 TABLET, EXTENDED RELEASE ORAL ONCE
Status: COMPLETED | OUTPATIENT
Start: 2023-08-14 | End: 2023-08-14

## 2023-08-14 RX ORDER — MORPHINE SULFATE 4 MG/ML
4 INJECTION, SOLUTION INTRAMUSCULAR; INTRAVENOUS ONCE
Status: COMPLETED | OUTPATIENT
Start: 2023-08-14 | End: 2023-08-14

## 2023-08-14 RX ADMIN — MORPHINE SULFATE 4 MG: 4 INJECTION INTRAVENOUS at 01:41

## 2023-08-14 RX ADMIN — POTASSIUM CHLORIDE 40 MEQ: 1500 TABLET, EXTENDED RELEASE ORAL at 03:45

## 2023-08-14 RX ADMIN — SODIUM CHLORIDE 1000 ML: 0.9 INJECTION, SOLUTION INTRAVENOUS at 01:43

## 2023-08-14 NOTE — ED PROVIDER NOTES
History  Chief Complaint   Patient presents with   • Flank Pain     Seen here last night, dx with kidney stone. Took oxy with minimal relief. Reports right sided flank pain and nausea. The patient is a 28-year-old male with no significant past medical history who presents to the ED for evaluation of right-sided flank pain and abdominal pain with associated nausea. He was seen in the ED yesterday, and was diagnosed with a 3 mm right-sided mid ureteral calculus and hydronephrosis. He was discharged with Zofran, Flomax, Motrin, and Oxycodone which he has been taking without significant relief. He reports he initially had improvement in pain, but his pain worsened as the day went on. He otherwise denies fever, chills, testicular pain,  diarrhea, melena, hematochezia, constipation, chest pain, dyspnea. Prior to Admission Medications   Prescriptions Last Dose Informant Patient Reported?  Taking?   ibuprofen (MOTRIN) 600 mg tablet Not Taking  No No   Sig: Take 1 tablet (600 mg total) by mouth every 6 (six) hours as needed for mild pain or moderate pain   Patient not taking: Reported on 8/14/2023   ondansetron (ZOFRAN-ODT) 4 mg disintegrating tablet 8/14/2023  No Yes   Sig: Take 1 tablet (4 mg total) by mouth every 6 (six) hours as needed for nausea or vomiting   oxyCODONE (Roxicodone) 5 immediate release tablet 8/14/2023  No Yes   Sig: Take 1 tablet (5 mg total) by mouth every 6 (six) hours as needed for severe pain for up to 15 doses Max Daily Amount: 20 mg   tamsulosin (FLOMAX) 0.4 mg Not Taking  No No   Sig: Take 1 capsule (0.4 mg total) by mouth daily with dinner for 10 days   Patient not taking: Reported on 8/14/2023      Facility-Administered Medications: None       Past Medical History:   Diagnosis Date   • Anxiety    • Depression    • No known problems        Past Surgical History:   Procedure Laterality Date   • SKIN GRAFT         Family History   Problem Relation Age of Onset   • Hypertension Father    • Diabetes Maternal Grandmother      I have reviewed and agree with the history as documented. E-Cigarette/Vaping   • E-Cigarette Use Never User      E-Cigarette/Vaping Substances     Social History     Tobacco Use   • Smoking status: Former   • Smokeless tobacco: Never   Vaping Use   • Vaping Use: Never used   Substance Use Topics   • Alcohol use: Not Currently     Comment: occasionally   • Drug use: Yes     Types: Marijuana     Comment: States has cut back on use       Review of Systems   Constitutional: Negative for chills and fever. HENT: Negative for congestion and rhinorrhea. Respiratory: Negative for cough and shortness of breath. Cardiovascular: Negative for chest pain and leg swelling. Gastrointestinal: Positive for abdominal pain and nausea. Negative for constipation, diarrhea and vomiting. Genitourinary: Positive for flank pain. Negative for dysuria. Musculoskeletal: Negative for arthralgias and myalgias. Skin: Negative for rash and wound. Neurological: Negative for dizziness, weakness, numbness and headaches. Psychiatric/Behavioral: Negative for behavioral problems. Physical Exam  Physical Exam  Vitals and nursing note reviewed. Constitutional:       General: He is not in acute distress. Appearance: He is well-developed. HENT:      Head: Normocephalic and atraumatic. Eyes:      Conjunctiva/sclera: Conjunctivae normal.   Cardiovascular:      Rate and Rhythm: Normal rate and regular rhythm. Heart sounds: No murmur heard. Pulmonary:      Effort: Pulmonary effort is normal. No respiratory distress. Breath sounds: Normal breath sounds. Abdominal:      Palpations: Abdomen is soft. Tenderness: There is no abdominal tenderness. There is right CVA tenderness. Musculoskeletal:         General: No swelling. Cervical back: Neck supple. Skin:     General: Skin is warm and dry. Capillary Refill: Capillary refill takes less than 2 seconds. Neurological:      Mental Status: He is alert.    Psychiatric:         Mood and Affect: Mood normal.         Vital Signs  ED Triage Vitals   Temperature Pulse Respirations Blood Pressure SpO2   08/14/23 0054 08/14/23 0055 08/14/23 0055 08/14/23 0055 08/14/23 0055   97.8 °F (36.6 °C) 71 16 134/80 100 %      Temp Source Heart Rate Source Patient Position - Orthostatic VS BP Location FiO2 (%)   08/14/23 0054 08/14/23 0055 08/14/23 0055 08/14/23 0055 --   Oral Monitor Sitting Right arm       Pain Score       08/14/23 0054       8           Vitals:    08/14/23 0055 08/14/23 0312   BP: 134/80 127/58   Pulse: 71 64   Patient Position - Orthostatic VS: Sitting          Visual Acuity      ED Medications  Medications   sodium chloride 0.9 % bolus 1,000 mL (0 mL Intravenous Stopped 8/14/23 0312)   morphine injection 4 mg (4 mg Intravenous Given 8/14/23 0141)   potassium chloride (K-DUR,KLOR-CON) CR tablet 40 mEq (40 mEq Oral Given 8/14/23 0345)       Diagnostic Studies  Results Reviewed     Procedure Component Value Units Date/Time    Basic metabolic panel [[id number]]  (Abnormal) Collected: 08/14/23 0140    Lab Status: Final result Specimen: Blood from Arm, Left Updated: 08/14/23 0206     Sodium 140 mmol/L      Potassium 3.2 mmol/L      Chloride 109 mmol/L      CO2 24 mmol/L      ANION GAP 7 mmol/L      BUN 15 mg/dL      Creatinine 1.16 mg/dL      Glucose 90 mg/dL      Calcium 9.0 mg/dL      eGFR 83 ml/min/1.73sq m     Narrative:      [place] guidelines for Chronic Kidney Disease (CKD):   •  Stage 1 with normal or high GFR (GFR > 90 mL/min/1.73 square meters)  •  Stage 2 Mild CKD (GFR = 60-89 mL/min/1.73 square meters)  •  Stage 3A Moderate CKD (GFR = 45-59 mL/min/1.73 square meters)  •  Stage 3B Moderate CKD (GFR = 30-44 mL/min/1.73 square meters)  •  Stage 4 Severe CKD (GFR = 15-29 mL/min/1.73 square meters)  •  Stage 5 End Stage CKD (GFR <15 mL/min/1.73 square meters)  Note: GFR calculation is accurate only with a steady state creatinine    CBC and differential [[id number]]  (Abnormal) Collected: 08/14/23 0140    Lab Status: Final result Specimen: Blood from Arm, Left Updated: 08/14/23 0151     WBC 5.59 Thousand/uL      RBC 3.85 Million/uL      Hemoglobin 11.7 g/dL      Hematocrit 36.9 %      MCV 96 fL      MCH 30.4 pg      MCHC 31.7 g/dL      RDW 13.0 %      MPV 9.9 fL      Platelets 135 Thousands/uL      nRBC 0 /100 WBCs      Neutrophils Relative 50 %      Immat GRANS % 0 %      Lymphocytes Relative 42 %      Monocytes Relative 7 %      Eosinophils Relative 1 %      Basophils Relative 0 %      Neutrophils Absolute 2.78 Thousands/µL      Immature Grans Absolute 0.01 Thousand/uL      Lymphocytes Absolute 2.34 Thousands/µL      Monocytes Absolute 0.40 Thousand/µL      Eosinophils Absolute 0.04 Thousand/µL      Basophils Absolute 0.02 Thousands/µL                  No orders to display              Procedures  Procedures         ED Course  ED Course as of 08/14/23 0356   Mon Aug 14, 2023   0209 Creatinine: 1.16  Stable   0209 eGFR: 83  Stable   0235 On reexamination, patient reports resolution of his pain. Discussed with patient recommendation for admission given return to ED with failure for outpatient management, however patient declines and would like to be discharged. Will further observe patient for return of pain   0342 On re-examination, patient continues to report pain is 0/10. Would like to be d/c. Will d/c       Medical Decision Making  Ddx ureterolithiasis, ALEXA    Will obtain CBC to evaluate for leukocytosis, anemia. Will obtain CMP to evaluate kidney function, for electrolyte disturbance. Patient's labs stable., Hypokalemia remains, though improved from yesterday. Patient with improvement in pain in the ED. Offered admission due to failure of outpatient treatment, however patient declined. Hypokalemia and remains    At the time of discharge, the patient is in no acute distress.  I discussed with the patient the diagnosis, treatment plan, follow-up, return precautions, and discharge instructions; they were given the opportunity to ask questions and verbalized understanding. Ureterolithiasis: acute illness or injury  Amount and/or Complexity of Data Reviewed  External Data Reviewed: labs, radiology and notes. Labs: ordered. Decision-making details documented in ED Course. Risk  Prescription drug management. Disposition  Final diagnoses:   Ureterolithiasis     Time reflects when diagnosis was documented in both MDM as applicable and the Disposition within this note     Time User Action Codes Description Comment    8/14/2023  3:35 AM Precious Sanz Add [N20.1] Ureterolithiasis       ED Disposition     ED Disposition   Discharge    Condition   Stable    Date/Time   Mon Aug 14, 2023  3:17 AM    Comment   Piter Ramos discharge to home/self care.                Follow-up Information     Follow up With Specialties Details Why Contact Info Additional [address] [facility]   [place] [id number]  [address] [place] [address] For Urology [facility], [address] [place] [address], [place], [place], [address] [phone]          Discharge Medication List as of 8/14/2023  3:35 AM      CONTINUE these medications which have NOT CHANGED    Details   ondansetron (ZOFRAN-ODT) 4 mg disintegrating tablet Take 1 tablet (4 mg total) by mouth every 6 (six) hours as needed for nausea or vomiting, Starting Sun 8/13/2023, Normal      oxyCODONE (Roxicodone) 5 immediate release tablet Take 1 tablet (5 mg total) by mouth every 6 (six) hours as needed for severe pain for up to 15 doses Max Daily Amount: 20 mg, Starting Sun 8/13/2023, Normal      ibuprofen (MOTRIN) 600 mg tablet Take 1 tablet (600 mg total) by mouth every 6 (six) hours as needed for mild pain or moderate pain, Starting Sun 8/13/2023, Normal tamsulosin (FLOMAX) 0.4 mg Take 1 capsule (0.4 mg total) by mouth daily with dinner for 10 days, Starting Sun 8/13/2023, Until Wed 8/23/2023, Normal             No discharge procedures on file.     PDMP Review     None          ED Provider  Electronically Signed by           Deonna Long PA-C  08/14/23 4891

## 2023-08-14 NOTE — TELEPHONE ENCOUNTER
3mm right ureteral calculus with mild hydro  Anticipate spontaneous passage soon  Any provider 2 weeks thx

## 2023-08-14 NOTE — TELEPHONE ENCOUNTER
New Patient    What is the reason for the patient’s appointment?: ED F/U- Kidney stones     What office location does the patient prefer?: [place]     Does patient have Imaging/Lab Results:    CT 8/13/23    Have patient records been requested?:  If No, are the records showing in Epic: records in epic       INSURANCE:   Do we accept the patient's insurance or is the patient Self-Pay?:     Insurance Provider: Manuela Collins [facility]:  [id number]  Member ID#:  [id number]      HISTORY:   Has the patient had any previous Urologist(s)?:  No     Was the patient seen in the ED?: 8/13/23    Has the patient had any outside testing done?: no     Does the patient have a personal history of cancer?: no     Please review ED visit and imaging and schedule patient in appropriate time frame.      CB: [phone]

## 2023-08-14 NOTE — DISCHARGE INSTRUCTIONS
Follow-up with urology as planned    Take medications as previously prescribed    Return to the ER for worsening pain, fever, chills

## 2023-08-15 NOTE — TELEPHONE ENCOUNTER
Call placed to patient and spoke with him. Pt is scheduled to see Dr. Andres Ndiaye on 8- at 11:15am to establish care. Pt confirmed this appointment.

## 2023-09-18 PROBLEM — N20.1 URETEROLITHIASIS: Status: ACTIVE | Noted: 2023-09-18

## 2023-09-18 PROBLEM — K76.0 FATTY LIVER: Status: ACTIVE | Noted: 2023-09-18

## 2023-09-18 PROBLEM — Z11.3 SCREENING FOR STD (SEXUALLY TRANSMITTED DISEASE): Status: RESOLVED | Noted: 2020-03-10 | Resolved: 2023-09-18

## 2023-12-21 NOTE — LETTER
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
clear to auscultation bilaterally/no wheezes/no rales/no rhonchi

## 2024-01-22 ENCOUNTER — CLINICAL SUPPORT (OUTPATIENT)
Dept: FAMILY MEDICINE CLINIC | Facility: CLINIC | Age: 34
End: 2024-01-22
Payer: COMMERCIAL

## 2024-01-22 DIAGNOSIS — Z23 ENCOUNTER FOR IMMUNIZATION: Primary | ICD-10-CM

## 2024-01-22 PROCEDURE — 90471 IMMUNIZATION ADMIN: CPT

## 2024-01-22 PROCEDURE — 90632 HEPA VACCINE ADULT IM: CPT

## 2024-02-21 PROBLEM — Z00.00 WELL ADULT EXAM: Status: RESOLVED | Noted: 2020-03-10 | Resolved: 2024-02-21

## 2024-08-29 ENCOUNTER — OFFICE VISIT (OUTPATIENT)
Dept: FAMILY MEDICINE CLINIC | Facility: CLINIC | Age: 34
End: 2024-08-29
Payer: COMMERCIAL

## 2024-08-29 VITALS
WEIGHT: 166.4 LBS | HEART RATE: 78 BPM | HEIGHT: 69 IN | SYSTOLIC BLOOD PRESSURE: 122 MMHG | TEMPERATURE: 98.7 F | BODY MASS INDEX: 24.65 KG/M2 | DIASTOLIC BLOOD PRESSURE: 76 MMHG | OXYGEN SATURATION: 99 % | RESPIRATION RATE: 18 BRPM

## 2024-08-29 DIAGNOSIS — Z00.00 ANNUAL PHYSICAL EXAM: Primary | ICD-10-CM

## 2024-08-29 DIAGNOSIS — R21 RASH: ICD-10-CM

## 2024-08-29 DIAGNOSIS — F32.A MILD DEPRESSION: ICD-10-CM

## 2024-08-29 DIAGNOSIS — Z13.6 SCREENING FOR CARDIOVASCULAR CONDITION: ICD-10-CM

## 2024-08-29 DIAGNOSIS — R14.0 BLOATING: ICD-10-CM

## 2024-08-29 DIAGNOSIS — K76.0 FATTY LIVER: ICD-10-CM

## 2024-08-29 PROCEDURE — 99214 OFFICE O/P EST MOD 30 MIN: CPT | Performed by: NURSE PRACTITIONER

## 2024-08-29 PROCEDURE — 99395 PREV VISIT EST AGE 18-39: CPT | Performed by: NURSE PRACTITIONER

## 2024-08-29 NOTE — PROGRESS NOTES
Adult Annual Physical  Name: Gavin A Reyes      : 1990      MRN: [id number]  Encounter Provider: NÉSTOR Og  Encounter Date: 2024   Encounter department: [facility]    Assessment & Plan   1. Annual physical exam  -     CBC and differential; Future  -     Basic metabolic panel; Future  2. Mild depression  Assessment & Plan:  PHQ-2/9 Depression Screening    Little interest or pleasure in doing things: 0 - not at all  Feeling down, depressed, or hopeless: 1 - several days  Trouble falling or staying asleep, or sleeping too much: 1 - several days  Feeling tired or having little energy: 0 - not at all  Poor appetite or overeatin - not at all  Feeling bad about yourself - or that you are a failure or have let yourself or your family down: 0 - not at all  Trouble concentrating on things, such as reading the newspaper or watching television: 0 - not at all  Moving or speaking so slowly that other people could have noticed. Or the opposite - being so fidgety or restless that you have been moving around a lot more than usual: 0 - not at all  Thoughts that you would be better off dead, or of hurting yourself in some way: 0 - not at all  PHQ-9 Score: 2  PHQ-9 Interpretation: No or Minimal depression     Stable at present.   3. Bloating  Assessment & Plan:  Recently changed diet. Symptoms improved.   4. Rash  Assessment & Plan:  Dillon presents to the office today with concern for a rash on his groin.  This started about one week ago. This rash occurred after a sexual encounter. The patient did use a condom.  He states he used selsun blue on this area and it resolved. On exam, there are no residual skin issues.  Reassured the patient that I am not concerned for an STD   5. Fatty liver  Assessment & Plan:  Noted on ultrasound of the abdomen in .    6. Screening for cardiovascular condition  -     Lipid Panel with Direct LDL reflex; Future    Immunizations and preventive care  screenings were discussed with patient today. Appropriate education was printed on patient's after visit summary.    Counseling:  Alcohol/drug use: discussed moderation in alcohol intake, the recommendations for healthy alcohol use, and avoidance of illicit drug use.  Dental Health: discussed importance of regular tooth brushing, flossing, and dental visits.  Injury prevention: discussed safety/seat belts, safety helmets, smoke detectors, carbon dioxide detectors, and smoking near bedding or upholstery.  Sexual health: discussed sexually transmitted diseases, partner selection, use of condoms, avoidance of unintended pregnancy, and contraceptive alternatives.  Exercise: the importance of regular exercise/physical activity was discussed. Recommend exercise 3-5 times per week for at least 30 minutes.       Depression Screening and Follow-up Plan: Patient was screened for depression during today's encounter. They screened negative with a PHQ-9 score of 2.        History of Present Illness     Adult Annual Physical:  Patient presents for annual physical.     Diet and Physical Activity:  - Diet/Nutrition: well balanced diet, consuming 3-5 servings of fruits/vegetables daily and limited junk food.  - Exercise: moderate cardiovascular exercise, less than 30 minutes on average and 5-7 times a week on average.    Depression Screening:    - PHQ-9 Score: 2    General Health:  - Sleep: sleeps well.  - Hearing: normal hearing bilateral ears.  - Vision: no vision problems.  - Dental: regular dental visits and brushes teeth twice daily.     Health:  - History of STDs: no.   - Urinary symptoms: none.     Review of Systems   Constitutional: Negative.  Negative for fatigue.   HENT: Negative.  Negative for congestion, postnasal drip, rhinorrhea and trouble swallowing.    Eyes: Negative.  Negative for visual disturbance.   Respiratory: Negative.  Negative for choking and shortness of breath.    Cardiovascular: Negative.  Negative for  "chest pain.   Gastrointestinal: Negative.    Endocrine: Negative.    Genitourinary: Negative.    Musculoskeletal: Negative.  Negative for arthralgias, back pain, myalgias and neck pain.   Skin:  Positive for rash.   Neurological:  Negative for dizziness and headaches.   Psychiatric/Behavioral: Negative.           Objective     /76   Pulse 78   Temp 98.7 °F (37.1 °C) (Tympanic)   Resp 18   Ht 5' 9\" (1.753 m)   Wt 75.5 kg (166 lb 6.4 oz)   SpO2 99%   BMI 24.57 kg/m²     Physical Exam  Vitals and nursing note reviewed.   Constitutional:       Appearance: Normal appearance. He is well-developed and normal weight.   HENT:      Head: Normocephalic and atraumatic.      Right Ear: Tympanic membrane, ear canal and external ear normal. There is no impacted cerumen.      Left Ear: Tympanic membrane, ear canal and external ear normal. There is no impacted cerumen.      Nose: Nose normal.      Mouth/Throat:      Mouth: Mucous membranes are moist.      Pharynx: Oropharynx is clear.   Eyes:      Conjunctiva/sclera: Conjunctivae normal.   Cardiovascular:      Rate and Rhythm: Normal rate and regular rhythm.      Pulses: Normal pulses.      Heart sounds: Normal heart sounds. No murmur heard.  Pulmonary:      Effort: Pulmonary effort is normal. No respiratory distress.      Breath sounds: Normal breath sounds.   Abdominal:      General: Bowel sounds are normal. There is no distension.      Palpations: Abdomen is soft. There is no mass.      Tenderness: There is no abdominal tenderness. There is no guarding or rebound.      Hernia: No hernia is present.   Genitourinary:      Musculoskeletal:         General: Normal range of motion.      Cervical back: Normal range of motion and neck supple.   Skin:     General: Skin is warm and dry.   Neurological:      General: No focal deficit present.      Mental Status: He is alert and oriented to person, place, and time. Mental status is at baseline.   Psychiatric:         Mood and " Affect: Mood normal.         Behavior: Behavior normal.         Thought Content: Thought content normal.         Judgment: Judgment normal.

## 2024-08-29 NOTE — ASSESSMENT & PLAN NOTE
PHQ-2/9 Depression Screening    Little interest or pleasure in doing things: 0 - not at all  Feeling down, depressed, or hopeless: 1 - several days  Trouble falling or staying asleep, or sleeping too much: 1 - several days  Feeling tired or having little energy: 0 - not at all  Poor appetite or overeatin - not at all  Feeling bad about yourself - or that you are a failure or have let yourself or your family down: 0 - not at all  Trouble concentrating on things, such as reading the newspaper or watching television: 0 - not at all  Moving or speaking so slowly that other people could have noticed. Or the opposite - being so fidgety or restless that you have been moving around a lot more than usual: 0 - not at all  Thoughts that you would be better off dead, or of hurting yourself in some way: 0 - not at all  PHQ-9 Score: 2  PHQ-9 Interpretation: No or Minimal depression     Stable at present.

## 2024-08-29 NOTE — ASSESSMENT & PLAN NOTE
Dillon presents to the office today with concern for a rash on his groin.  This started about one week ago. This rash occurred after a sexual encounter. The patient did use a condom.  He states he used selsun blue on this area and it resolved. On exam, there are no residual skin issues.  Reassured the patient that I am not concerned for an STD

## 2024-08-29 NOTE — PROGRESS NOTES
"Ambulatory Visit  Name: Gavin A Reyes      : 1990      MRN: [id number]  Encounter Provider: NÉSTOR Og  Encounter Date: 2024   Encounter department: [facility]    Assessment & Plan   {There are no diagnoses linked to this encounter. (Refresh or delete this SmartLink)}     History of Present Illness   {Disappearing Hyperlinks I Encounters * My Last Note * Since Last Visit * History :98838}  HPI    Review of Systems  {Select to Display PMH (Optional):25820}  Objective   {Disappearing Hyperlinks   Review Vitals * Enter New Vitals * Results Review * Labs * Imaging * Cardiology * Procedures * Lung Cancer Screening :65458}  /76   Pulse 78   Temp 98.7 °F (37.1 °C) (Tympanic)   Resp 18   Ht 5' 9\" (1.753 m)   Wt 75.5 kg (166 lb 6.4 oz)   SpO2 99%   BMI 24.57 kg/m²     Physical Exam  Administrative Statements {Disappearing Hyperlinks I  Level of Service * PCMH/PCSP:21801}  {Time Spent Statement (Optional):83368}  "

## 2025-06-21 ENCOUNTER — APPOINTMENT (EMERGENCY)
Dept: RADIOLOGY | Facility: HOSPITAL | Age: 35
End: 2025-06-21
Payer: COMMERCIAL

## 2025-06-21 ENCOUNTER — HOSPITAL ENCOUNTER (EMERGENCY)
Facility: HOSPITAL | Age: 35
Discharge: HOME | End: 2025-06-21
Attending: EMERGENCY MEDICINE | Admitting: EMERGENCY MEDICINE
Payer: COMMERCIAL

## 2025-06-21 VITALS
TEMPERATURE: 98.9 F | SYSTOLIC BLOOD PRESSURE: 113 MMHG | DIASTOLIC BLOOD PRESSURE: 68 MMHG | RESPIRATION RATE: 16 BRPM | HEART RATE: 81 BPM | OXYGEN SATURATION: 97 % | WEIGHT: 167.55 LBS | BODY MASS INDEX: 24.74 KG/M2

## 2025-06-21 DIAGNOSIS — S43.014A ANTERIOR DISLOCATION OF RIGHT SHOULDER, INITIAL ENCOUNTER: Primary | ICD-10-CM

## 2025-06-21 PROCEDURE — 73030 X-RAY EXAM OF SHOULDER: CPT

## 2025-06-21 PROCEDURE — 96374 THER/PROPH/DIAG INJ IV PUSH: CPT

## 2025-06-21 PROCEDURE — 99152 MOD SED SAME PHYS/QHP 5/>YRS: CPT | Performed by: EMERGENCY MEDICINE

## 2025-06-21 PROCEDURE — 96375 TX/PRO/DX INJ NEW DRUG ADDON: CPT

## 2025-06-21 PROCEDURE — 99283 EMERGENCY DEPT VISIT LOW MDM: CPT

## 2025-06-21 PROCEDURE — 23650 CLTX SHO DSLC W/MNPJ WO ANES: CPT | Performed by: EMERGENCY MEDICINE

## 2025-06-21 PROCEDURE — 99285 EMERGENCY DEPT VISIT HI MDM: CPT | Performed by: EMERGENCY MEDICINE

## 2025-06-21 RX ORDER — NAPROXEN 250 MG/1
250 TABLET ORAL
Qty: 21 TABLET | Refills: 0 | Status: SHIPPED | OUTPATIENT
Start: 2025-06-21 | End: 2025-06-21

## 2025-06-21 RX ORDER — PROPOFOL 10 MG/ML
200 INJECTION, EMULSION INTRAVENOUS ONCE
Status: COMPLETED | OUTPATIENT
Start: 2025-06-21 | End: 2025-06-21

## 2025-06-21 RX ORDER — ONDANSETRON 2 MG/ML
INJECTION INTRAMUSCULAR; INTRAVENOUS
Status: COMPLETED
Start: 2025-06-21 | End: 2025-06-21

## 2025-06-21 RX ORDER — DIAZEPAM 10 MG/2ML
2.5 INJECTION, SOLUTION INTRAMUSCULAR; INTRAVENOUS ONCE
Status: COMPLETED | OUTPATIENT
Start: 2025-06-21 | End: 2025-06-21

## 2025-06-21 RX ORDER — ONDANSETRON 2 MG/ML
4 INJECTION INTRAMUSCULAR; INTRAVENOUS ONCE
Status: COMPLETED | OUTPATIENT
Start: 2025-06-21 | End: 2025-06-21

## 2025-06-21 RX ORDER — MORPHINE SULFATE 4 MG/ML
4 INJECTION, SOLUTION INTRAMUSCULAR; INTRAVENOUS ONCE
Status: COMPLETED | OUTPATIENT
Start: 2025-06-21 | End: 2025-06-21

## 2025-06-21 RX ADMIN — MORPHINE SULFATE 4 MG: 4 INJECTION INTRAVENOUS at 11:25

## 2025-06-21 RX ADMIN — PROPOFOL 175 MG: 10 INJECTION, EMULSION INTRAVENOUS at 12:00

## 2025-06-21 RX ADMIN — ONDANSETRON 4 MG: 2 INJECTION INTRAMUSCULAR; INTRAVENOUS at 11:29

## 2025-06-21 RX ADMIN — DIAZEPAM 2.5 MG: 5 INJECTION INTRAMUSCULAR; INTRAVENOUS at 11:25

## 2025-06-21 NOTE — Clinical Note
Gavin Reyes was seen and treated in our emergency department on 6/21/2025.            no using right shoulder until cleared by orthopedics    Diagnosis:     Dillon  may return to work on return date.    He may return on this date:          If you have any questions or concerns, please don't hesitate to call.      Carlos Leroy MD    ______________________________           _______________          _______________  Hospital Representative                              Date                                Time

## 2025-06-21 NOTE — ED PROVIDER NOTES
Time reflects when diagnosis was documented in both MDM as applicable and the Disposition within this note       Time User Action Codes Description Comment    6/21/2025 12:50 PM Carlos Leroy Add [S42.91XA] Traumatic closed displaced fracture of right shoulder with anterior dislocation, initial encounter     6/21/2025 12:51 PM Carlos Leroy Remove [S42.91XA] Traumatic closed displaced fracture of right shoulder with anterior dislocation, initial encounter     6/21/2025 12:51 PM Carlos Leroy Add [S43.014A] Anterior dislocation of right shoulder, initial encounter           ED Disposition       ED Disposition   Discharge    Condition   Stable    Date/Time   Sat Jun 21, 2025 12:50 PM    Comment   Dillon PURDY Reyes discharge to home/self care.                   Assessment & Plan       Medical Decision Making  Acute traumatic right shoulder pain the history exam findings suggest infectious etiology or gout and medical workup is not indicated.  Yester doubt fracture, dislocation.    Amount and/or Complexity of Data Reviewed  Radiology: ordered and independent interpretation performed. Decision-making details documented in ED Course.    Risk  Prescription drug management.        ED Course as of 06/21/25 1253   Sat Jun 21, 2025   1140 XR shoulder 2+ views RIGHT  Primary reviewed: anterior shoulder dislocation       Medications   morphine injection 4 mg (4 mg Intravenous Given 6/21/25 1125)   diazepam (VALIUM) injection 2.5 mg (2.5 mg Intravenous Given 6/21/25 1125)   ondansetron (ZOFRAN) injection 4 mg (4 mg Intravenous Given 6/21/25 1129)   propofol (DIPRIVAN) 200 MG/20ML bolus injection 200 mg (175 mg Intravenous Given by Other 6/21/25 1200)       ED Risk Strat Scores                    No data recorded        SBIRT 22yo+      Flowsheet Row Most Recent Value   Initial Alcohol Screen: US AUDIT-C     1. How often do you have a drink containing alcohol? 0 Filed at: 06/21/2025 1111   2. How many drinks containing alcohol do you  have on a typical day you are drinking?  0 Filed at: 06/21/2025 1111   3a. Male UNDER 65: How often do you have five or more drinks on one occasion? 0 Filed at: 06/21/2025 1111   Audit-C Score 0 Filed at: 06/21/2025 1111   PITA: How many times in the past year have you...    Used an illegal drug or used a prescription medication for non-medical reasons? Never Filed at: 06/21/2025 1111                            History of Present Illness       Chief Complaint   Patient presents with    Shoulder Injury     Pt dislocated shoulder during basketball today- happened to the same arm 4 years ago. Obvious deformity noted.        Past Medical History[1]   Past Surgical History[2]   Family History[3]   Social History[4]   E-Cigarette/Vaping    E-Cigarette Use Never User       E-Cigarette/Vaping Substances      I have reviewed and agree with the history as documented.     35-year-old male presents for evaluation of sudden onset of right shoulder pain and deformity.  Patient is he was playing basketball when he reached his right arm up and felt a pop.  He developed sudden onset of severe pain causing him to fall to the floor.  The pain has been present since.  He denies head strike, loss of consciousness, other traumatic injuries.  Patient denies paresthesias, numbness.      Shoulder Injury  Associated symptoms: no back pain and no fever        Review of Systems   Constitutional:  Negative for chills and fever.   HENT:  Negative for ear pain and sore throat.    Eyes:  Negative for pain and visual disturbance.   Respiratory:  Negative for cough and shortness of breath.    Cardiovascular:  Negative for chest pain and palpitations.   Gastrointestinal:  Negative for abdominal pain and vomiting.   Genitourinary:  Negative for dysuria and hematuria.   Musculoskeletal:  Negative for arthralgias and back pain.   Skin:  Negative for color change and rash.   Neurological:  Negative for seizures and syncope.   All other systems reviewed  and are negative.          Objective       ED Triage Vitals [06/21/25 1110]   Temperature Pulse Blood Pressure Respirations SpO2 Patient Position - Orthostatic VS   98.9 °F (37.2 °C) (!) 130 118/56 22 98 % Sitting      Temp Source Heart Rate Source BP Location FiO2 (%) Pain Score    Oral Monitor Left arm -- 10 - Worst Possible Pain      Vitals      Date and Time Temp Pulse SpO2 Resp BP Pain Score FACES Pain Rating User   06/21/25 1220 -- 84 100 % 16 104/66 No Pain --    06/21/25 1215 -- 90 100 % 16 104/66 -- --    06/21/25 1210 -- 88 96 % 19 118/59 No Pain --    06/21/25 1205 -- 88 96 % 16 119/59 -- --    06/21/25 1200 -- 108 95 % 20 148/63 -- --    06/21/25 1155 -- 81 98 % 20 108/55 -- --    06/21/25 1130 -- 115 99 % 20 105/54 8 --    06/21/25 1110 98.9 °F (37.2 °C) 130 98 % 22 118/56 10 - Worst Possible Pain -- LB            Physical Exam  Constitutional:       Appearance: He is well-developed.   HENT:      Head: Normocephalic and atraumatic.     Eyes:      General: No scleral icterus.     Conjunctiva/sclera: Conjunctivae normal.     Neck:      Vascular: No JVD.      Trachea: No tracheal deviation.   Pulmonary:      Effort: Pulmonary effort is normal. No respiratory distress.   Abdominal:      General: There is no distension.     Musculoskeletal:         General: Normal range of motion.      Cervical back: Normal range of motion.      Comments: Right shoulder with obvious deformity, decreased range of motion, painful passive range of motion, right elbow/wrist/hand exams within normal limits, neurovascular intact.     Skin:     Coloration: Skin is not pale.      Findings: No erythema or rash.     Neurological:      Mental Status: He is alert and oriented to person, place, and time.     Psychiatric:         Behavior: Behavior normal.         Results Reviewed       None            XR shoulder 2+ views RIGHT   ED Interpretation by Carlos Leroy MD (06/21 1250)   Primary reviewed: successful reduction       XR shoulder 2+ views RIGHT    (Results Pending)       Pre-Procedural Sedation    Performed by: Carlos Leroy MD  Authorized by: Carlos Leroy MD    Consent:     Consent obtained:  Verbal    Consent given by:  Patient    Risks discussed:  Allergic reaction, dysrhythmia, inadequate sedation, nausea, vomiting, respiratory compromise necessitating ventilatory assistance and intubation, prolonged sedation necessitating reversal and prolonged hypoxia resulting in organ damage    Alternatives discussed:  Analgesia without sedation  Universal protocol:     Procedure explained and questions answered to patient or proxy's satisfaction: yes      Relevant documents present and verified: yes      Test results available and properly labeled: yes      Patient identity confirmation method:  Verbally with patient and arm band  Indications:     Sedation purpose:  Dislocation reduction    Procedure necessitating sedation performed by:  Physician performing sedation    Intended level of sedation:  Moderate (conscious sedation)  Pre-sedation assessment:     NPO status caution: urgency dictates proceeding with non-ideal NPO status      ASA classification: class 1 - normal, healthy patient      Neck mobility: normal      Mouth opening:  3 or more finger widths    Thyromental distance:  4 finger widths    Mallampati score:  I - soft palate, uvula, fauces, pillars visible    Pre-sedation assessments completed and reviewed: airway patency, cardiovascular function, hydration status, mental status, nausea/vomiting, pain level, respiratory function and temperature    Procedural Sedation    Date/Time: 6/21/2025 12:11 PM    Performed by: Carlos Leroy MD  Authorized by: Carlos Leroy MD    Immediate pre-procedure details:     Reassessment: Patient reassessed immediately prior to procedure      Reviewed: vital signs, relevant labs/tests and NPO status      Verified: bag valve mask available, emergency equipment available,  intubation equipment available, IV patency confirmed, oxygen available, reversal medications available and suction available    Procedure details (see MAR for exact dosages):     Sedation start time:  6/21/2025 11:55 AM    Preoxygenation:  Nasal cannula    Sedation:  Propofol    Intra-procedure monitoring:  Blood pressure monitoring, cardiac monitor, continuous pulse oximetry, continuous capnometry, frequent LOC assessments and frequent vital sign checks    Intra-procedure events: none      Total sedation time (minutes):  15  Post-procedure details:     Attendance: Constant attendance by certified staff until patient recovered      Recovery: Patient returned to pre-procedure baseline      Post-sedation assessments completed and reviewed: airway patency, cardiovascular function, hydration status, mental status, nausea/vomiting, pain level, respiratory function and temperature      Patient is stable for discharge or admission: yes      Patient tolerance:  Tolerated well, no immediate complications  Orthopedic injury treatment    Date/Time: 6/21/2025 12:00 PM    Performed by: Carlos Leroy MD  Authorized by: Carlos Leroy MD    Patient Location:  ED    [place] Protocol:  Consent: Verbal consent obtained  Risks and benefits: risks, benefits and alternatives were discussed  Consent given by: parent  Timeout called at: 6/21/2025 11:55 AM.  Patient understanding: patient states understanding of the procedure being performed  Patient consent: the patient's understanding of the procedure matches consent given  Procedure consent: procedure consent matches procedure scheduled  Required items: required blood products, implants, devices, and special equipment available  Patient identity confirmed: verbally with patient and arm band    Injury location: R shoulder.  Neurovascular status: Neurovascularly intact    Sedation type:  Moderate (conscious) sedation (See separate Procedural Sedation form)  Skeletal traction used?:  Yes    Immobilization:  Sling  Neurovascular status: Neurovascularly intact        ED Medication and Procedure Management   None     There are no discharge medications for this patient.    No discharge procedures on file.  ED SEPSIS DOCUMENTATION   Time reflects when diagnosis was documented in both MDM as applicable and the Disposition within this note       Time User Action Codes Description Comment    6/21/2025 12:50 PM Carlos Leroy Add [S42.91XA] Traumatic closed displaced fracture of right shoulder with anterior dislocation, initial encounter     6/21/2025 12:51 PM Carlos Leroy Remove [S42.91XA] Traumatic closed displaced fracture of right shoulder with anterior dislocation, initial encounter     6/21/2025 12:51 PM Carlos Leroy Add [S43.014A] Anterior dislocation of right shoulder, initial encounter                      [1]   Past Medical History:  Diagnosis Date    Anxiety     Depression     No known problems    [2]   Past Surgical History:  Procedure Laterality Date    SKIN GRAFT     [3]   Family History  Problem Relation Name Age of Onset    Hypertension Father      Diabetes Maternal Grandmother     [4]   Social History  Tobacco Use    Smoking status: Former     Passive exposure: Never    Smokeless tobacco: Never   Vaping Use    Vaping status: Never Used   Substance Use Topics    Alcohol use: Not Currently     Comment: occasionally    Drug use: Yes     Types: Marijuana     Comment: States has cut back on use        Carlos Leroy MD  06/21/25 [id number]

## 2025-06-23 ENCOUNTER — OFFICE VISIT (OUTPATIENT)
Dept: OBGYN CLINIC | Facility: OTHER | Age: 35
End: 2025-06-23
Payer: COMMERCIAL

## 2025-06-23 VITALS — HEIGHT: 69 IN | BODY MASS INDEX: 24.29 KG/M2 | WEIGHT: 164 LBS

## 2025-06-23 DIAGNOSIS — M25.311 INSTABILITY OF RIGHT SHOULDER JOINT: Primary | ICD-10-CM

## 2025-06-23 DIAGNOSIS — S43.014A ANTERIOR DISLOCATION OF RIGHT SHOULDER, INITIAL ENCOUNTER: ICD-10-CM

## 2025-06-23 PROCEDURE — 99204 OFFICE O/P NEW MOD 45 MIN: CPT | Performed by: ORTHOPAEDIC SURGERY

## 2025-06-23 NOTE — LETTER
June 23, 2025     Patient: Gavin A Reyes  YOB: 1990  Date of Visit: 6/23/2025      To Whom it May Concern:    Gavin Reyes is under my professional care. Dillon was seen in my office on 6/23/2025.    Please allow him light duty for 1 week, no lifting, pushing, pulling anything more than 10lbs with his right upper extremity. If you have any questions or concerns, please don't hesitate to call.          Sincerely,          Walker Yeung MD        CC: No Recipients  [email or web address]

## 2025-06-23 NOTE — PROGRESS NOTES
I personally examined the patient and reviewed the history provided.  I agree with the note and the assessment and plan by Dr. Sotero Valencia MD.     Assessment:    Right shoulder recurrent instability    Plan:    As below the patient has developed recurrent instability of his right shoulder and does have a quite significant Hill-Sachs deformity on his radiographs.  He likely has some anterior inferior glenoid bone loss and surgical stabilization may be indicated in the future, at this point he wishes to attempt physical therapy which is reasonable but if he fails to improve then further imaging the shoulder in the form of a CT scan with 3D glenoid reconstruction and an MRI arthrogram of the right shoulder will be helpful in determining what type of surgical stabilization would beneficial for the patient.    Assessment & Plan  Instability of right shoulder joint  Anterior dislocation of right shoulder, initial encounter    Orders:    Ambulatory Referral to Physical Therapy; Future  The patient has an examination, imaging, and history consistent with instability of his right GH joint in the setting of multiple shoulder dislocations. I have discussed with the patient the pathophysiology of this diagnosis and reviewed how the examination correlates with this diagnosis.  Concerning that the patient has not sought any prior treatment for this, we discussed that the initial treatment should begin with a course of physical therapy.  We discussed that there is a risk for subsequent dislocations and should he continue to dislocate despite therapy, he may need additional workup with further imaging and discussion of endoscopic versus open surgeries repair. He may follow up in 6-8 weeks after completing his physical therapy. Note provided today for work with restrictions of no lifting, pulling, pushing more than 10lbs through his for upper extremity for 1 week.    Subjective:   Patient ID: Dillon PURDY Reyes is a 35 y.o.  "male      HPI  The patient presents with a chief complaint of right shoulder pain.   The pain began 2 day(s) ago and is associated with an acute injury.  Patient was playing basketball when he went for a lay up and collided with another player midair to his right elbow he noted immediate dislocation of his right elbow.  He presented to the ED where he was closed reduced under sedation.  Since his injury, he has been able to tolerate full range of motion of his shoulder  with minimal pain.  The patient describes the pain as sharp in intensity,  intermittent in timing, and localizes the pain to the  right glenohumeral joint.  The pain is worse with overuse and relieved by avoiding the painful activities.  The pain is not associated with numbness and tingling.  The pain is not associated with constitutional symptoms. The patient is not awoken at night by the pain.  Patient is a past history of 2 prior dislocations which needed from reduction in the ED.  He was seen by Dr. Márquez in 2019 and was recommended physical therapy though he did not go to physical therapy at this time.  He has had no prior treatment.      The following portions of the patient's history were reviewed and updated as appropriate: allergies, current medications, past family history, past medical history, past social history, past surgical history and problem list.        Objective:  Ht 5' 9\" (1.753 m) Comment: verbal  Wt 74.4 kg (164 lb)   BMI 24.22 kg/m²       Right Shoulder Exam     Range of Motion   Active abduction:  160   External rotation:  60   Forward flexion:  170   Internal rotation 0 degrees:  normal     Muscle Strength   Abduction: 5/5   Internal rotation: 5/5   External rotation: 5/5   Supraspinatus: 5/5   Subscapularis: 5/5   Biceps: 5/5     Tests   Sulcus: absent    Other   Sensation: normal  Pulse: present              I have personally reviewed pertinent films in PACS and my interpretation is as follows.    X-ray of the right " shoulder show a well located glenohumeral joint with Hill-Sachs lesion of the proximal humerus.  No bony Bankart seen on imaging.      Records Reviewed: ER records from 6/21/2025 reviewed, where patient's right shoulder was closed reduced under sedation.

## 2025-06-23 NOTE — ASSESSMENT & PLAN NOTE
Orders:    Ambulatory Referral to Physical Therapy; Future  The patient has an examination, imaging, and history consistent with instability of his right GH joint in the setting of multiple shoulder dislocations. I have discussed with the patient the pathophysiology of this diagnosis and reviewed how the examination correlates with this diagnosis.  Concerning that the patient has not sought any prior treatment for this, we discussed that the initial treatment should begin with a course of physical therapy.  We discussed that there is a risk for subsequent dislocations and should he continue to dislocate despite therapy, he may need additional workup with further imaging and discussion of endoscopic versus open surgeries repair. He may follow up in 6-8 weeks after completing his physical therapy. Note provided today for work with restrictions of no lifting, pulling, pushing more than 10lbs through his for upper extremity for 1 week.

## 2025-07-01 ENCOUNTER — TELEPHONE (OUTPATIENT)
Age: 35
End: 2025-07-01

## 2025-07-01 NOTE — TELEPHONE ENCOUNTER
Caller: Patient    Doctor: Dr. Yeung    Reason for call:     #: [phone]    Work note: yes    Return to work date: effective 7/1/25    Fax #: Upload to [employer] for him to retrieve    Is there any restrictions that need to be updated? If so, what?     Patient is asking to be released to his normal duties as of today for his right shoulder, no restrictions.

## 2025-07-02 ENCOUNTER — EVALUATION (OUTPATIENT)
Dept: PHYSICAL THERAPY | Facility: CLINIC | Age: 35
End: 2025-07-02
Payer: COMMERCIAL

## 2025-07-02 DIAGNOSIS — M25.311 INSTABILITY OF RIGHT SHOULDER JOINT: ICD-10-CM

## 2025-07-02 DIAGNOSIS — S43.014A ANTERIOR DISLOCATION OF RIGHT SHOULDER, INITIAL ENCOUNTER: ICD-10-CM

## 2025-07-02 PROCEDURE — 97110 THERAPEUTIC EXERCISES: CPT

## 2025-07-02 PROCEDURE — 97161 PT EVAL LOW COMPLEX 20 MIN: CPT

## 2025-07-02 PROCEDURE — 97112 NEUROMUSCULAR REEDUCATION: CPT

## 2025-07-02 NOTE — PROGRESS NOTES
PT Evaluation     Today's date: 2025  Patient name: Gavin A Reyes  : 1990  MRN: [id number]  Referring provider: Roselia Guerrero MD  Dx:   Encounter Diagnosis     ICD-10-CM    1. Instability of right shoulder joint  M25.311 Ambulatory Referral to Physical Therapy      2. Anterior dislocation of right shoulder, initial encounter  S43.014A Ambulatory Referral to Physical Therapy          Start Time: 1655  Stop Time: 1730  Total time in clinic (min): 35 minutes    Assessment  Impairments: abnormal muscle firing, abnormal or restricted ROM, activity intolerance, impaired physical strength, lacks appropriate home exercise program, pain with function and poor body mechanics  Symptom irritability: moderate    Assessment details: Pt is a 35 y.o. year old male presenting to physical therapy for Instability of right shoulder joint and anterior dislocation of right shoulder. He presents with the following impairments: slight reduction of strength and right rotator cuff, scapular stabilization, and postural musculature and decreased end range functional behind back IR. Pt was given an HEP to perform several exercises at home to address minimal deficits seen.  Understanding of Dx/Px/POC: good     Prognosis: good    Goals  ST. Pt will be independent with HEP.  2. Pt will improve right shoulder mobility deficits by 50%   3. Pt will improve FOTO score from baseline set during initial evaluation  LT. Pt will be able to reach overhead to the same height of left shoulder to improve reaching needed for ADLs  2. Pt will improve right shoulder strength grossly by 2 grades or more  3. Pt will be able to lift 10# without increase in symptoms in shoulder  4. Pt will reach FOTO goal set by [facility] during initial evaluation      Plan  Patient would benefit from: PT eval and skilled physical therapy  Planned modality interventions: biofeedback, manual electrical stimulation, microcurrent electrical stimulation, TENS,  "electrical stimulation/Russian stimulation, thermotherapy: hydrocollator packs, cryotherapy and unattended electrical stimulation    Planned therapy interventions: abdominal trunk stabilization, joint mobilization, manual therapy, massage, ADL retraining, neuromuscular re-education, body mechanics training, patient education, postural training, strengthening, stretching, therapeutic activities, therapeutic exercise, flexibility, functional ROM exercises and home exercise program    Treatment plan discussed with: patient        Subjective Evaluation    History of Present Illness  Mechanism of injury: Pt is a 35 y.o. male presenting with chronic right shoulder pain and frequent anterior dislocations. Pt reports that this pain has been present for 6 years now, but his most recent dislocation occurred 6/21/2025. Pt noted that he was playing basketball when he reached his right arm up and felt a pop. Pt then went to the ER and received x-rays on his right shoulder on 6/21 which showed \"Interval reduction of right humeral head dislocation. Question Hill-Sachs deformity. Anterior glenohumeral joint dislocation.\"  Pt noted that he has a history of 3 dislocations. Pt then had a follow up appointment with his MD the following day where they discussed it would be likely to need surgery due to x-ray findings, but pt stated that he would like to try conservative treatment with PT before surgery. Pt noted that they are not currently having any pain. Pt reports that end range quick movements with abduction and er is what causes them the most pain and are the most difficult movements for them to perfrom. Pt stated that their main goals for therapy are pain reduction and improved function with end range quick movements.    Quality of life: good    Patient Goals  Patient goals for therapy: decreased edema, decreased pain, improved balance, increased motion, increased strength and independence with ADLs/IADLs    Pain  Current pain " ratin  At best pain ratin  At worst pain ratin  Quality: dull ache  Relieving factors: medications  Aggravating factors: overhead activity          Objective     Active Range of Motion   Cervical/Thoracic Spine       Thoracic    Flexion:  WFL  Extension:  WFL  Left lateral flexion:  WFL  Right lateral flexion:  WFL  Left rotation:  WFL  Right rotation:  WFL  Left Shoulder   Flexion: WFL  Extension: WFL  Abduction: WFL  External rotation BTH: T3   Internal rotation BTB: T4     Right Shoulder   Flexion: WFL  Extension: WFL  Abduction: WFL  External rotation BTH: T3   Internal rotation BTB: T7     Scapular Mobility   Left Shoulder   Scapular mobility: WFL    Right Shoulder   Scapular mobility: WFL    Strength/Myotome Testing   Cervical Spine     Left   Levator scapulae (C4): 5    Right   Levator scapulae (C4): 4+    Left Shoulder     Planes of Motion   Flexion: 5   Extension: 5   Abduction: 5   External rotation at 0°: 5   Internal rotation at 0°: 5     Isolated Muscles   Levator scapulae: 5   Lower trapezius: 5   Middle trapezius: 5   Serratus anterior: 5   Upper trapezius: 5     Right Shoulder     Planes of Motion   Flexion: 5   Extension: 5   Abduction: 5   External rotation at 0°: 5   Internal rotation at 0°: 5     Isolated Muscles   Levator scapulae: 4+   Lower trapezius: 4+   Middle trapezius: 4+   Serratus anterior: 4+   Upper trapezius: 4+     Left Elbow   Flexion: 5  Extension: 5    Right Elbow   Flexion: 5  Extension: 5    Tests   Cervical   Negative vertical compression and cervical distraction.     Left   Negative Spurling's Test A.     Right   Negative Spurling's Test A.     Left Shoulder   Negative Adson maneuver, crossover, drop arm, empty can, external rotation lag sign, full can, Hawkin's, horn blower, internal rotation lag sign, Neer's, painful arc, ULTT1, ULTT3 and ULTT4.     Right Shoulder   Positive Hawkin's.   Negative active compression ([place]), crossover, drop arm, empty can,  external rotation lag sign, full can, horn blower, internal rotation lag sign, Neer's, painful arc, ULTT1, ULTT3 and ULTT4.     Lumbar   Negative vertical compression.     General Comments:      Shoulder Comments   Posture: WFL   No TTP throughout right shoulder  No increased tension of associated musculature             Precautions: Several Anterior shoulder dislocations Hill-Sachs deformity.      Date 7/2            Visit # IE            FOTO IE             Re-eval IE               Manuals 7/2                     Shoulder PROM                       Scapular Mob                       Shoulder STM                                               Neuro Re-Ed 7/2                     Prone ITY 20x ea             No Monies 30x PurpTB                     Resisted Shoulder Flexion  30x PurpTB                     Resisted Shoulder ABD  30x PurpTB                                                                   Ther Ex 7/2                     UBE                      Rows 30x PurpTb                     Extension 30x PurpTb            Wall Clocks  5 laps PurpTB                     Wall Walks  5 laps PurpTb                                             Ther Activity 7/2                     Cones                                               Gait Training 7/2                                                                     Modalities 7/2                     Ice